# Patient Record
Sex: FEMALE | Race: BLACK OR AFRICAN AMERICAN | NOT HISPANIC OR LATINO | URBAN - METROPOLITAN AREA
[De-identification: names, ages, dates, MRNs, and addresses within clinical notes are randomized per-mention and may not be internally consistent; named-entity substitution may affect disease eponyms.]

---

## 2018-04-30 ENCOUNTER — EMERGENCY (EMERGENCY)
Facility: HOSPITAL | Age: 43
LOS: 1 days | Discharge: HOME | End: 2018-04-30
Attending: EMERGENCY MEDICINE | Admitting: EMERGENCY MEDICINE
Payer: COMMERCIAL

## 2018-04-30 VITALS
SYSTOLIC BLOOD PRESSURE: 146 MMHG | TEMPERATURE: 99 F | RESPIRATION RATE: 18 BRPM | OXYGEN SATURATION: 100 % | DIASTOLIC BLOOD PRESSURE: 90 MMHG | HEART RATE: 75 BPM

## 2018-04-30 VITALS
OXYGEN SATURATION: 100 % | RESPIRATION RATE: 18 BRPM | SYSTOLIC BLOOD PRESSURE: 185 MMHG | TEMPERATURE: 98 F | HEART RATE: 58 BPM | WEIGHT: 192.9 LBS | DIASTOLIC BLOOD PRESSURE: 96 MMHG

## 2018-04-30 DIAGNOSIS — R10.9 UNSPECIFIED ABDOMINAL PAIN: ICD-10-CM

## 2018-04-30 DIAGNOSIS — R10.13 EPIGASTRIC PAIN: ICD-10-CM

## 2018-04-30 LAB
ALBUMIN SERPL ELPH-MCNC: 4.4 G/DL — SIGNIFICANT CHANGE UP (ref 3.3–5)
ALP SERPL-CCNC: 76 U/L — SIGNIFICANT CHANGE UP (ref 40–120)
ALT FLD-CCNC: 36 U/L — SIGNIFICANT CHANGE UP (ref 10–45)
ANION GAP SERPL CALC-SCNC: 16 MMOL/L — SIGNIFICANT CHANGE UP (ref 5–17)
AST SERPL-CCNC: 40 U/L — SIGNIFICANT CHANGE UP (ref 10–40)
BASOPHILS NFR BLD AUTO: 0.4 % — SIGNIFICANT CHANGE UP (ref 0–2)
BILIRUB SERPL-MCNC: 0.7 MG/DL — SIGNIFICANT CHANGE UP (ref 0.2–1.2)
BUN SERPL-MCNC: 10 MG/DL — SIGNIFICANT CHANGE UP (ref 7–23)
CALCIUM SERPL-MCNC: 9.3 MG/DL — SIGNIFICANT CHANGE UP (ref 8.4–10.5)
CHLORIDE SERPL-SCNC: 94 MMOL/L — LOW (ref 96–108)
CO2 SERPL-SCNC: 23 MMOL/L — SIGNIFICANT CHANGE UP (ref 22–31)
CREAT SERPL-MCNC: 0.71 MG/DL — SIGNIFICANT CHANGE UP (ref 0.5–1.3)
EOSINOPHIL NFR BLD AUTO: 0 % — SIGNIFICANT CHANGE UP (ref 0–6)
GLUCOSE SERPL-MCNC: 138 MG/DL — HIGH (ref 70–99)
HCT VFR BLD CALC: 43 % — SIGNIFICANT CHANGE UP (ref 34.5–45)
HGB BLD-MCNC: 14.4 G/DL — SIGNIFICANT CHANGE UP (ref 11.5–15.5)
LIDOCAIN IGE QN: 23 U/L — SIGNIFICANT CHANGE UP (ref 7–60)
LYMPHOCYTES # BLD AUTO: 12.8 % — LOW (ref 13–44)
MCHC RBC-ENTMCNC: 30.2 PG — SIGNIFICANT CHANGE UP (ref 27–34)
MCHC RBC-ENTMCNC: 33.5 G/DL — SIGNIFICANT CHANGE UP (ref 32–36)
MCV RBC AUTO: 90.1 FL — SIGNIFICANT CHANGE UP (ref 80–100)
MONOCYTES NFR BLD AUTO: 4.1 % — SIGNIFICANT CHANGE UP (ref 2–14)
NEUTROPHILS NFR BLD AUTO: 82.7 % — HIGH (ref 43–77)
PLATELET # BLD AUTO: 251 K/UL — SIGNIFICANT CHANGE UP (ref 150–400)
POTASSIUM SERPL-MCNC: 4.5 MMOL/L — SIGNIFICANT CHANGE UP (ref 3.5–5.3)
POTASSIUM SERPL-SCNC: 4.5 MMOL/L — SIGNIFICANT CHANGE UP (ref 3.5–5.3)
PROT SERPL-MCNC: 8.7 G/DL — HIGH (ref 6–8.3)
RBC # BLD: 4.77 M/UL — SIGNIFICANT CHANGE UP (ref 3.8–5.2)
RBC # FLD: 12.2 % — SIGNIFICANT CHANGE UP (ref 10.3–16.9)
SODIUM SERPL-SCNC: 133 MMOL/L — LOW (ref 135–145)
WBC # BLD: 7.8 K/UL — SIGNIFICANT CHANGE UP (ref 3.8–10.5)
WBC # FLD AUTO: 7.8 K/UL — SIGNIFICANT CHANGE UP (ref 3.8–10.5)

## 2018-04-30 PROCEDURE — 84702 CHORIONIC GONADOTROPIN TEST: CPT

## 2018-04-30 PROCEDURE — 85025 COMPLETE CBC W/AUTO DIFF WBC: CPT

## 2018-04-30 PROCEDURE — 96374 THER/PROPH/DIAG INJ IV PUSH: CPT

## 2018-04-30 PROCEDURE — 99284 EMERGENCY DEPT VISIT MOD MDM: CPT | Mod: 25

## 2018-04-30 PROCEDURE — 80053 COMPREHEN METABOLIC PANEL: CPT

## 2018-04-30 PROCEDURE — 36415 COLL VENOUS BLD VENIPUNCTURE: CPT

## 2018-04-30 PROCEDURE — 96375 TX/PRO/DX INJ NEW DRUG ADDON: CPT

## 2018-04-30 PROCEDURE — 99284 EMERGENCY DEPT VISIT MOD MDM: CPT

## 2018-04-30 PROCEDURE — 83690 ASSAY OF LIPASE: CPT

## 2018-04-30 RX ORDER — ONDANSETRON 8 MG/1
4 TABLET, FILM COATED ORAL ONCE
Qty: 0 | Refills: 0 | Status: COMPLETED | OUTPATIENT
Start: 2018-04-30 | End: 2018-04-30

## 2018-04-30 RX ORDER — SODIUM CHLORIDE 9 MG/ML
1000 INJECTION INTRAMUSCULAR; INTRAVENOUS; SUBCUTANEOUS ONCE
Qty: 0 | Refills: 0 | Status: COMPLETED | OUTPATIENT
Start: 2018-04-30 | End: 2018-04-30

## 2018-04-30 RX ORDER — HYDROMORPHONE HYDROCHLORIDE 2 MG/ML
1 INJECTION INTRAMUSCULAR; INTRAVENOUS; SUBCUTANEOUS ONCE
Qty: 0 | Refills: 0 | Status: DISCONTINUED | OUTPATIENT
Start: 2018-04-30 | End: 2018-04-30

## 2018-04-30 RX ORDER — FAMOTIDINE 10 MG/ML
20 INJECTION INTRAVENOUS ONCE
Qty: 0 | Refills: 0 | Status: COMPLETED | OUTPATIENT
Start: 2018-04-30 | End: 2018-04-30

## 2018-04-30 RX ORDER — HYDROMORPHONE HYDROCHLORIDE 2 MG/ML
0.5 INJECTION INTRAMUSCULAR; INTRAVENOUS; SUBCUTANEOUS ONCE
Qty: 0 | Refills: 0 | Status: DISCONTINUED | OUTPATIENT
Start: 2018-04-30 | End: 2018-04-30

## 2018-04-30 RX ORDER — METOCLOPRAMIDE HCL 10 MG
10 TABLET ORAL ONCE
Qty: 0 | Refills: 0 | Status: COMPLETED | OUTPATIENT
Start: 2018-04-30 | End: 2018-04-30

## 2018-04-30 RX ORDER — IOHEXOL 300 MG/ML
50 INJECTION, SOLUTION INTRAVENOUS ONCE
Qty: 0 | Refills: 0 | Status: COMPLETED | OUTPATIENT
Start: 2018-04-30 | End: 2018-04-30

## 2018-04-30 RX ADMIN — SODIUM CHLORIDE 2000 MILLILITER(S): 9 INJECTION INTRAMUSCULAR; INTRAVENOUS; SUBCUTANEOUS at 17:27

## 2018-04-30 RX ADMIN — HYDROMORPHONE HYDROCHLORIDE 1 MILLIGRAM(S): 2 INJECTION INTRAMUSCULAR; INTRAVENOUS; SUBCUTANEOUS at 15:48

## 2018-04-30 RX ADMIN — IOHEXOL 50 MILLILITER(S): 300 INJECTION, SOLUTION INTRAVENOUS at 19:58

## 2018-04-30 RX ADMIN — ONDANSETRON 4 MILLIGRAM(S): 8 TABLET, FILM COATED ORAL at 15:02

## 2018-04-30 RX ADMIN — FAMOTIDINE 20 MILLIGRAM(S): 10 INJECTION INTRAVENOUS at 15:33

## 2018-04-30 RX ADMIN — Medication 10 MILLIGRAM(S): at 20:30

## 2018-04-30 RX ADMIN — SODIUM CHLORIDE 1000 MILLILITER(S): 9 INJECTION INTRAMUSCULAR; INTRAVENOUS; SUBCUTANEOUS at 15:02

## 2018-04-30 RX ADMIN — HYDROMORPHONE HYDROCHLORIDE 1 MILLIGRAM(S): 2 INJECTION INTRAMUSCULAR; INTRAVENOUS; SUBCUTANEOUS at 15:33

## 2018-04-30 NOTE — ED ADULT NURSE NOTE - OBJECTIVE STATEMENT
Pt reports starting menstrual cycle on Saturday, and since has been experiencing LUQ pain, n/v, unable to eat/drink. Pt also reports diarrhea since Saturday. Pt denies chills, fever, chest pain, SOB, dizziness.

## 2018-04-30 NOTE — ED PROVIDER NOTE - PROGRESS NOTE DETAILS
Pt was feeling better but now pain and nausea returned, still in upper abdomen and epigastrium. Will Ct a/p to r/o colitis. Reglan for nausea and Dilaudid prn for pain. Dispo pending CT. took over care, Mor: pt with hx of endometriosis with upper epi pain, nausea, received meds for discomfort, receiving CT to eval for colitis.

## 2018-04-30 NOTE — ED PROVIDER NOTE - PHYSICAL EXAMINATION
GEN: Well appearing, well nourished, awake, alert, oriented to person, place, time/situation and in no apparent distress.  ENT: Airway patent, Nasal mucosa clear. Mouth with normal mucosa.  EYES: Clear bilaterally.  RESPIRATORY: Breathing comfortably with normal RR.  CARDIAC: Regular rate and rhythm  ABDOMEN: Soft, mild epigastric discomfort, +bowel sounds, no rebound, rigidity, or guarding. No masses or hernia, nonsurgical abd exam.   MSK: Range of motion is not limited, no deformities noted.  NEURO: Alert and oriented, no focal deficits.  SKIN: Skin normal color for race, warm, dry and intact. No evidence of rash.  PSYCH: Alert and oriented to person, place, time/situation. normal mood and affect. no apparent risk to self or others.

## 2018-04-30 NOTE — ED PROVIDER NOTE - MEDICAL DECISION MAKING DETAILS
42F with above PMHx,  x 2, with abdominal pain in epigastrium 42F with above PMHx,  x 2, with abdominal pain in epigastrium.  Patient pain free feeling better. Will collect UA but no symptoms. Tolerated PO. Warning instructions were discussed.

## 2018-04-30 NOTE — ED PROVIDER NOTE - OBJECTIVE STATEMENT
42F with a h/o endometriosis, LMP 4 days ago who p/w abdominal pain, nausea and nonbloody vomits, c/w her endometriosis pain. Pain is typical in her epigastrium, not lower abdomina. She report he endo was under control with OCPs but she stopped these 3 months ago bc her sx had been improving. no CP/ SOB, no urinary sx. OB is Dr. Sloan in [place]. 42F with a h/o endometriosis, LMP 4 days ago who p/w abdominal pain, nausea and nonbloody vomits, c/w her endometriosis pain. Pt states that her endometriosis pain is typical in her epigastrium, not lower abdomen. She reports her endo was under control with OCPs but she stopped these 3 months ago bc her sx had been improving. no f/c, CP/ SOB, no urinary sx. OB is Dr. Sloan in [place].

## 2018-04-30 NOTE — ED ADULT NURSE REASSESSMENT NOTE - NS ED NURSE REASSESS COMMENT FT1
Pt resting on stretcher AAO x 3 speech is clear and coherent breathing even and unlabored. pt c/o nausea. Pt medicated as ordered with reglan. Pt states pressure like feeling in abdominal region. pt states " I am trying to tolerate CT drink but I am unable too" ERP made aware

## 2018-05-01 NOTE — ED ADULT NURSE REASSESSMENT NOTE - NS ED NURSE REASSESS COMMENT FT1
pt discharged. pt states " I feel so much better I am ready to go home"  Pt denies pain at this time. Pt verbally acknowledged discharge instructions.

## 2018-07-05 NOTE — ED ADULT NURSE NOTE - NS ED NURSE LEVEL OF CONSCIOUSNESS SPEECH
Pt is participating with therapy and expected to return home w/family support  Pt has been educated on potential recommendations for contd care and safety as well as therapy services  Speaking Coherently

## 2018-11-05 ENCOUNTER — EMERGENCY (EMERGENCY)
Facility: HOSPITAL | Age: 43
LOS: 1 days | Discharge: HOME | End: 2018-11-05
Attending: EMERGENCY MEDICINE | Admitting: EMERGENCY MEDICINE
Payer: COMMERCIAL

## 2018-11-05 VITALS
HEART RATE: 62 BPM | TEMPERATURE: 99 F | DIASTOLIC BLOOD PRESSURE: 93 MMHG | RESPIRATION RATE: 18 BRPM | SYSTOLIC BLOOD PRESSURE: 176 MMHG | OXYGEN SATURATION: 100 %

## 2018-11-05 VITALS
RESPIRATION RATE: 16 BRPM | TEMPERATURE: 98 F | DIASTOLIC BLOOD PRESSURE: 70 MMHG | SYSTOLIC BLOOD PRESSURE: 114 MMHG | HEART RATE: 60 BPM | OXYGEN SATURATION: 95 %

## 2018-11-05 DIAGNOSIS — K25.7 CHRONIC GASTRIC ULCER WITHOUT HEMORRHAGE OR PERFORATION: ICD-10-CM

## 2018-11-05 DIAGNOSIS — R10.13 EPIGASTRIC PAIN: ICD-10-CM

## 2018-11-05 LAB
ALBUMIN SERPL ELPH-MCNC: 4.4 G/DL — SIGNIFICANT CHANGE UP (ref 3.3–5)
ALP SERPL-CCNC: 68 U/L — SIGNIFICANT CHANGE UP (ref 40–120)
ALT FLD-CCNC: 19 U/L — SIGNIFICANT CHANGE UP (ref 10–45)
ANION GAP SERPL CALC-SCNC: 20 MMOL/L — HIGH (ref 5–17)
AST SERPL-CCNC: 18 U/L — SIGNIFICANT CHANGE UP (ref 10–40)
BASOPHILS NFR BLD AUTO: 0.1 % — SIGNIFICANT CHANGE UP (ref 0–2)
BILIRUB SERPL-MCNC: 0.4 MG/DL — SIGNIFICANT CHANGE UP (ref 0.2–1.2)
BUN SERPL-MCNC: 9 MG/DL — SIGNIFICANT CHANGE UP (ref 7–23)
CALCIUM SERPL-MCNC: 9.3 MG/DL — SIGNIFICANT CHANGE UP (ref 8.4–10.5)
CHLORIDE SERPL-SCNC: 94 MMOL/L — LOW (ref 96–108)
CO2 SERPL-SCNC: 23 MMOL/L — SIGNIFICANT CHANGE UP (ref 22–31)
CREAT SERPL-MCNC: 0.76 MG/DL — SIGNIFICANT CHANGE UP (ref 0.5–1.3)
GLUCOSE SERPL-MCNC: 140 MG/DL — HIGH (ref 70–99)
HCT VFR BLD CALC: 36.8 % — SIGNIFICANT CHANGE UP (ref 34.5–45)
HGB BLD-MCNC: 12.4 G/DL — SIGNIFICANT CHANGE UP (ref 11.5–15.5)
LIDOCAIN IGE QN: 78 U/L — HIGH (ref 7–60)
LYMPHOCYTES # BLD AUTO: 10 % — LOW (ref 13–44)
MCHC RBC-ENTMCNC: 30.8 PG — SIGNIFICANT CHANGE UP (ref 27–34)
MCHC RBC-ENTMCNC: 33.7 G/DL — SIGNIFICANT CHANGE UP (ref 32–36)
MCV RBC AUTO: 91.3 FL — SIGNIFICANT CHANGE UP (ref 80–100)
MONOCYTES NFR BLD AUTO: 10.9 % — SIGNIFICANT CHANGE UP (ref 2–14)
NEUTROPHILS NFR BLD AUTO: 79 % — HIGH (ref 43–77)
PLATELET # BLD AUTO: 249 K/UL — SIGNIFICANT CHANGE UP (ref 150–400)
POTASSIUM SERPL-MCNC: 3.3 MMOL/L — LOW (ref 3.5–5.3)
POTASSIUM SERPL-SCNC: 3.3 MMOL/L — LOW (ref 3.5–5.3)
PROT SERPL-MCNC: 8.6 G/DL — HIGH (ref 6–8.3)
RBC # BLD: 4.03 M/UL — SIGNIFICANT CHANGE UP (ref 3.8–5.2)
RBC # FLD: 12.1 % — SIGNIFICANT CHANGE UP (ref 10.3–16.9)
SODIUM SERPL-SCNC: 137 MMOL/L — SIGNIFICANT CHANGE UP (ref 135–145)
WBC # BLD: 8.9 K/UL — SIGNIFICANT CHANGE UP (ref 3.8–10.5)
WBC # FLD AUTO: 8.9 K/UL — SIGNIFICANT CHANGE UP (ref 3.8–10.5)

## 2018-11-05 PROCEDURE — 85025 COMPLETE CBC W/AUTO DIFF WBC: CPT

## 2018-11-05 PROCEDURE — 76705 ECHO EXAM OF ABDOMEN: CPT | Mod: 26

## 2018-11-05 PROCEDURE — 83690 ASSAY OF LIPASE: CPT

## 2018-11-05 PROCEDURE — 80053 COMPREHEN METABOLIC PANEL: CPT

## 2018-11-05 PROCEDURE — 36415 COLL VENOUS BLD VENIPUNCTURE: CPT

## 2018-11-05 PROCEDURE — 99284 EMERGENCY DEPT VISIT MOD MDM: CPT | Mod: 25

## 2018-11-05 PROCEDURE — 76705 ECHO EXAM OF ABDOMEN: CPT

## 2018-11-05 PROCEDURE — 96375 TX/PRO/DX INJ NEW DRUG ADDON: CPT

## 2018-11-05 PROCEDURE — 96374 THER/PROPH/DIAG INJ IV PUSH: CPT

## 2018-11-05 PROCEDURE — 99285 EMERGENCY DEPT VISIT HI MDM: CPT

## 2018-11-05 RX ORDER — POTASSIUM CHLORIDE 20 MEQ
20 PACKET (EA) ORAL ONCE
Qty: 0 | Refills: 0 | Status: COMPLETED | OUTPATIENT
Start: 2018-11-05 | End: 2018-11-05

## 2018-11-05 RX ORDER — HYDROMORPHONE HYDROCHLORIDE 2 MG/ML
1 INJECTION INTRAMUSCULAR; INTRAVENOUS; SUBCUTANEOUS ONCE
Qty: 0 | Refills: 0 | Status: DISCONTINUED | OUTPATIENT
Start: 2018-11-05 | End: 2018-11-05

## 2018-11-05 RX ORDER — MORPHINE SULFATE 50 MG/1
2 CAPSULE, EXTENDED RELEASE ORAL ONCE
Qty: 0 | Refills: 0 | Status: DISCONTINUED | OUTPATIENT
Start: 2018-11-05 | End: 2018-11-05

## 2018-11-05 RX ORDER — FAMOTIDINE 10 MG/ML
20 INJECTION INTRAVENOUS ONCE
Qty: 0 | Refills: 0 | Status: COMPLETED | OUTPATIENT
Start: 2018-11-05 | End: 2018-11-05

## 2018-11-05 RX ORDER — SODIUM CHLORIDE 9 MG/ML
2000 INJECTION INTRAMUSCULAR; INTRAVENOUS; SUBCUTANEOUS ONCE
Qty: 0 | Refills: 0 | Status: COMPLETED | OUTPATIENT
Start: 2018-11-05 | End: 2018-11-05

## 2018-11-05 RX ORDER — LIDOCAINE 4 G/100G
15 CREAM TOPICAL ONCE
Qty: 0 | Refills: 0 | Status: COMPLETED | OUTPATIENT
Start: 2018-11-05 | End: 2018-11-05

## 2018-11-05 RX ORDER — ONDANSETRON 8 MG/1
4 TABLET, FILM COATED ORAL ONCE
Qty: 0 | Refills: 0 | Status: COMPLETED | OUTPATIENT
Start: 2018-11-05 | End: 2018-11-05

## 2018-11-05 RX ADMIN — MORPHINE SULFATE 2 MILLIGRAM(S): 50 CAPSULE, EXTENDED RELEASE ORAL at 19:19

## 2018-11-05 RX ADMIN — Medication 20 MILLIEQUIVALENT(S): at 21:14

## 2018-11-05 RX ADMIN — ONDANSETRON 4 MILLIGRAM(S): 8 TABLET, FILM COATED ORAL at 19:38

## 2018-11-05 RX ADMIN — FAMOTIDINE 20 MILLIGRAM(S): 10 INJECTION INTRAVENOUS at 19:03

## 2018-11-05 RX ADMIN — LIDOCAINE 15 MILLILITER(S): 4 CREAM TOPICAL at 19:03

## 2018-11-05 RX ADMIN — SODIUM CHLORIDE 1000 MILLILITER(S): 9 INJECTION INTRAMUSCULAR; INTRAVENOUS; SUBCUTANEOUS at 19:04

## 2018-11-05 RX ADMIN — Medication 30 MILLILITER(S): at 19:03

## 2018-11-05 RX ADMIN — MORPHINE SULFATE 2 MILLIGRAM(S): 50 CAPSULE, EXTENDED RELEASE ORAL at 19:04

## 2018-11-05 RX ADMIN — HYDROMORPHONE HYDROCHLORIDE 1 MILLIGRAM(S): 2 INJECTION INTRAMUSCULAR; INTRAVENOUS; SUBCUTANEOUS at 21:10

## 2018-11-05 RX ADMIN — HYDROMORPHONE HYDROCHLORIDE 1 MILLIGRAM(S): 2 INJECTION INTRAMUSCULAR; INTRAVENOUS; SUBCUTANEOUS at 19:41

## 2018-11-05 NOTE — ED ADULT NURSE NOTE - NSIMPLEMENTINTERV_GEN_ALL_ED
Implemented All Universal Safety Interventions:  [place] to call system. Call bell, personal items and telephone within reach. Instruct patient to call for assistance. Room bathroom lighting operational. Non-slip footwear when patient is off stretcher. Physically safe environment: no spills, clutter or unnecessary equipment. Stretcher in lowest position, wheels locked, appropriate side rails in place.

## 2018-11-05 NOTE — ED PROVIDER NOTE - OBJECTIVE STATEMENT
44 y/o female with a PMHx of gastric ulcer (confirmed on endoscopy) and endometriosis is present with abdominal pain. Pain is located epigastric without radiation. She describes the pain as sharp and constant that is 10/10 pain. Her associating symptoms include nausea and multiple episodes of vomiting. She denies the following: fever, chills, blood in vomit, dizziness, chest, pain, sob, difficulty breathing, hx of cardiac ds, gerd, use of alcohol, drugs, and denies hx of abdominal surgery, weight loss or loss of appetite.

## 2018-11-05 NOTE — ED PROVIDER NOTE - MEDICAL DECISION MAKING DETAILS
44 y/o female with epigastric pain, non-billous and non-bloody vomiting presenting in ED who was pain upgraded. Nausea and pain treated. Plan for labs and US. EKG nsr. Do not suspect cardiac. Possible exacerbation of gastric ulcer. Do not suspect perforation. Will dispo accordingly.

## 2018-11-05 NOTE — ED ADULT NURSE NOTE - OBJECTIVE STATEMENT
Pt presents with abd pain and vomiting since yesterday. Pt states she can't keep anything in her stomach. Pt also states she has a gastric ulcer and this feels like an exacerbation. Pt states pain is burning 10/10 in the epigastric area. Denies CP, SOB or fever.

## 2018-11-05 NOTE — ED PROVIDER NOTE - ATTENDING CONTRIBUTION TO CARE
I have examined the pt, reviewed all pertinent clinical data, and I agree with the documentation/care/plan executed by YAEL Antoine.

## 2018-11-06 PROBLEM — N80.9 ENDOMETRIOSIS, UNSPECIFIED: Chronic | Status: ACTIVE | Noted: 2018-04-30

## 2018-12-19 ENCOUNTER — EMERGENCY (EMERGENCY)
Facility: HOSPITAL | Age: 43
LOS: 1 days | Discharge: HOME | End: 2018-12-19
Attending: EMERGENCY MEDICINE | Admitting: EMERGENCY MEDICINE
Payer: COMMERCIAL

## 2018-12-19 VITALS
DIASTOLIC BLOOD PRESSURE: 95 MMHG | HEART RATE: 75 BPM | WEIGHT: 169.98 LBS | TEMPERATURE: 98 F | OXYGEN SATURATION: 100 % | RESPIRATION RATE: 18 BRPM | SYSTOLIC BLOOD PRESSURE: 192 MMHG | HEIGHT: 71 IN

## 2018-12-19 VITALS
OXYGEN SATURATION: 99 % | SYSTOLIC BLOOD PRESSURE: 145 MMHG | DIASTOLIC BLOOD PRESSURE: 82 MMHG | TEMPERATURE: 99 F | HEART RATE: 67 BPM | RESPIRATION RATE: 20 BRPM

## 2018-12-19 DIAGNOSIS — R11.2 NAUSEA WITH VOMITING, UNSPECIFIED: ICD-10-CM

## 2018-12-19 DIAGNOSIS — R10.84 GENERALIZED ABDOMINAL PAIN: ICD-10-CM

## 2018-12-19 LAB
ALBUMIN SERPL ELPH-MCNC: 4.3 G/DL — SIGNIFICANT CHANGE UP (ref 3.3–5)
ALP SERPL-CCNC: 71 U/L — SIGNIFICANT CHANGE UP (ref 40–120)
ALT FLD-CCNC: 16 U/L — SIGNIFICANT CHANGE UP (ref 10–45)
ANION GAP SERPL CALC-SCNC: 21 MMOL/L — HIGH (ref 5–17)
ANION GAP SERPL CALC-SCNC: 22 MMOL/L — HIGH (ref 5–17)
APPEARANCE UR: ABNORMAL
AST SERPL-CCNC: 17 U/L — SIGNIFICANT CHANGE UP (ref 10–40)
BACTERIA # UR AUTO: PRESENT /HPF
BASOPHILS NFR BLD AUTO: 0.1 % — SIGNIFICANT CHANGE UP (ref 0–2)
BILIRUB SERPL-MCNC: 0.4 MG/DL — SIGNIFICANT CHANGE UP (ref 0.2–1.2)
BILIRUB UR-MCNC: NEGATIVE — SIGNIFICANT CHANGE UP
BUN SERPL-MCNC: 6 MG/DL — LOW (ref 7–23)
BUN SERPL-MCNC: 6 MG/DL — LOW (ref 7–23)
CALCIUM SERPL-MCNC: 9.1 MG/DL — SIGNIFICANT CHANGE UP (ref 8.4–10.5)
CALCIUM SERPL-MCNC: 9.7 MG/DL — SIGNIFICANT CHANGE UP (ref 8.4–10.5)
CHLORIDE SERPL-SCNC: 94 MMOL/L — LOW (ref 96–108)
CHLORIDE SERPL-SCNC: 96 MMOL/L — SIGNIFICANT CHANGE UP (ref 96–108)
CO2 SERPL-SCNC: 19 MMOL/L — LOW (ref 22–31)
CO2 SERPL-SCNC: 22 MMOL/L — SIGNIFICANT CHANGE UP (ref 22–31)
COLOR SPEC: ABNORMAL
CREAT SERPL-MCNC: 0.62 MG/DL — SIGNIFICANT CHANGE UP (ref 0.5–1.3)
CREAT SERPL-MCNC: 0.62 MG/DL — SIGNIFICANT CHANGE UP (ref 0.5–1.3)
DIFF PNL FLD: ABNORMAL
EOSINOPHIL NFR BLD AUTO: 0 % — SIGNIFICANT CHANGE UP (ref 0–6)
EPI CELLS # UR: ABNORMAL /HPF (ref 0–5)
EXTRA BLUE TOP TUBE: SIGNIFICANT CHANGE UP
GLUCOSE SERPL-MCNC: 131 MG/DL — HIGH (ref 70–99)
GLUCOSE SERPL-MCNC: 163 MG/DL — HIGH (ref 70–99)
GLUCOSE UR QL: 250
HCG SERPL-ACNC: <.1 MIU/ML — SIGNIFICANT CHANGE UP
HCG UR QL: NEGATIVE — SIGNIFICANT CHANGE UP
HCT VFR BLD CALC: 39.8 % — SIGNIFICANT CHANGE UP (ref 34.5–45)
HGB BLD-MCNC: 13.4 G/DL — SIGNIFICANT CHANGE UP (ref 11.5–15.5)
KETONES UR-MCNC: ABNORMAL MG/DL
LEUKOCYTE ESTERASE UR-ACNC: ABNORMAL
LIDOCAIN IGE QN: 101 U/L — HIGH (ref 7–60)
LYMPHOCYTES # BLD AUTO: 7 % — LOW (ref 13–44)
MCHC RBC-ENTMCNC: 30.3 PG — SIGNIFICANT CHANGE UP (ref 27–34)
MCHC RBC-ENTMCNC: 33.7 G/DL — SIGNIFICANT CHANGE UP (ref 32–36)
MCV RBC AUTO: 90 FL — SIGNIFICANT CHANGE UP (ref 80–100)
MONOCYTES NFR BLD AUTO: 4.7 % — SIGNIFICANT CHANGE UP (ref 2–14)
NEUTROPHILS NFR BLD AUTO: 88.2 % — HIGH (ref 43–77)
NITRITE UR-MCNC: NEGATIVE — SIGNIFICANT CHANGE UP
PH UR: 7 — SIGNIFICANT CHANGE UP (ref 5–8)
PLATELET # BLD AUTO: 245 K/UL — SIGNIFICANT CHANGE UP (ref 150–400)
POTASSIUM SERPL-MCNC: 3.4 MMOL/L — LOW (ref 3.5–5.3)
POTASSIUM SERPL-MCNC: 3.6 MMOL/L — SIGNIFICANT CHANGE UP (ref 3.5–5.3)
POTASSIUM SERPL-SCNC: 3.4 MMOL/L — LOW (ref 3.5–5.3)
POTASSIUM SERPL-SCNC: 3.6 MMOL/L — SIGNIFICANT CHANGE UP (ref 3.5–5.3)
PROT SERPL-MCNC: 8.6 G/DL — HIGH (ref 6–8.3)
PROT UR-MCNC: 30 MG/DL
RBC # BLD: 4.42 M/UL — SIGNIFICANT CHANGE UP (ref 3.8–5.2)
RBC # FLD: 12.2 % — SIGNIFICANT CHANGE UP (ref 10.3–16.9)
RBC CASTS # UR COMP ASSIST: ABNORMAL /HPF
SODIUM SERPL-SCNC: 135 MMOL/L — SIGNIFICANT CHANGE UP (ref 135–145)
SODIUM SERPL-SCNC: 139 MMOL/L — SIGNIFICANT CHANGE UP (ref 135–145)
SP GR SPEC: 1.02 — SIGNIFICANT CHANGE UP (ref 1–1.03)
UROBILINOGEN FLD QL: 0.2 E.U./DL — SIGNIFICANT CHANGE UP
WBC # BLD: 8 K/UL — SIGNIFICANT CHANGE UP (ref 3.8–10.5)
WBC # FLD AUTO: 8 K/UL — SIGNIFICANT CHANGE UP (ref 3.8–10.5)
WBC UR QL: ABNORMAL /HPF

## 2018-12-19 PROCEDURE — 76856 US EXAM PELVIC COMPLETE: CPT

## 2018-12-19 PROCEDURE — 76856 US EXAM PELVIC COMPLETE: CPT | Mod: 26

## 2018-12-19 PROCEDURE — 85610 PROTHROMBIN TIME: CPT

## 2018-12-19 PROCEDURE — 81025 URINE PREGNANCY TEST: CPT

## 2018-12-19 PROCEDURE — 80053 COMPREHEN METABOLIC PANEL: CPT

## 2018-12-19 PROCEDURE — 83690 ASSAY OF LIPASE: CPT

## 2018-12-19 PROCEDURE — 80048 BASIC METABOLIC PNL TOTAL CA: CPT

## 2018-12-19 PROCEDURE — 99284 EMERGENCY DEPT VISIT MOD MDM: CPT | Mod: 25

## 2018-12-19 PROCEDURE — 85730 THROMBOPLASTIN TIME PARTIAL: CPT

## 2018-12-19 PROCEDURE — 76830 TRANSVAGINAL US NON-OB: CPT

## 2018-12-19 PROCEDURE — 96374 THER/PROPH/DIAG INJ IV PUSH: CPT

## 2018-12-19 PROCEDURE — 36415 COLL VENOUS BLD VENIPUNCTURE: CPT

## 2018-12-19 PROCEDURE — 96361 HYDRATE IV INFUSION ADD-ON: CPT

## 2018-12-19 PROCEDURE — 81001 URINALYSIS AUTO W/SCOPE: CPT

## 2018-12-19 PROCEDURE — 96375 TX/PRO/DX INJ NEW DRUG ADDON: CPT

## 2018-12-19 PROCEDURE — 99285 EMERGENCY DEPT VISIT HI MDM: CPT

## 2018-12-19 PROCEDURE — 96376 TX/PRO/DX INJ SAME DRUG ADON: CPT

## 2018-12-19 PROCEDURE — 84702 CHORIONIC GONADOTROPIN TEST: CPT

## 2018-12-19 PROCEDURE — 76830 TRANSVAGINAL US NON-OB: CPT | Mod: 26

## 2018-12-19 PROCEDURE — 85025 COMPLETE CBC W/AUTO DIFF WBC: CPT

## 2018-12-19 RX ORDER — SODIUM CHLORIDE 9 MG/ML
1000 INJECTION INTRAMUSCULAR; INTRAVENOUS; SUBCUTANEOUS ONCE
Qty: 0 | Refills: 0 | Status: COMPLETED | OUTPATIENT
Start: 2018-12-19 | End: 2018-12-19

## 2018-12-19 RX ORDER — ONDANSETRON 8 MG/1
4 TABLET, FILM COATED ORAL ONCE
Qty: 0 | Refills: 0 | Status: COMPLETED | OUTPATIENT
Start: 2018-12-19 | End: 2018-12-19

## 2018-12-19 RX ORDER — HYDROMORPHONE HYDROCHLORIDE 2 MG/ML
0.5 INJECTION INTRAMUSCULAR; INTRAVENOUS; SUBCUTANEOUS ONCE
Qty: 0 | Refills: 0 | Status: DISCONTINUED | OUTPATIENT
Start: 2018-12-19 | End: 2018-12-19

## 2018-12-19 RX ORDER — KETOROLAC TROMETHAMINE 30 MG/ML
30 SYRINGE (ML) INJECTION ONCE
Qty: 0 | Refills: 0 | Status: DISCONTINUED | OUTPATIENT
Start: 2018-12-19 | End: 2018-12-19

## 2018-12-19 RX ORDER — ONDANSETRON 8 MG/1
1 TABLET, FILM COATED ORAL
Qty: 12 | Refills: 0
Start: 2018-12-19 | End: 2018-12-22

## 2018-12-19 RX ORDER — LIDOCAINE 4 G/100G
10 CREAM TOPICAL ONCE
Qty: 0 | Refills: 0 | Status: COMPLETED | OUTPATIENT
Start: 2018-12-19 | End: 2018-12-19

## 2018-12-19 RX ORDER — ACETAMINOPHEN 500 MG
1000 TABLET ORAL ONCE
Qty: 0 | Refills: 0 | Status: COMPLETED | OUTPATIENT
Start: 2018-12-19 | End: 2018-12-19

## 2018-12-19 RX ORDER — PANTOPRAZOLE SODIUM 20 MG/1
40 TABLET, DELAYED RELEASE ORAL ONCE
Qty: 0 | Refills: 0 | Status: COMPLETED | OUTPATIENT
Start: 2018-12-19 | End: 2018-12-19

## 2018-12-19 RX ORDER — OXYCODONE AND ACETAMINOPHEN 5; 325 MG/1; MG/1
1 TABLET ORAL ONCE
Qty: 0 | Refills: 0 | Status: DISCONTINUED | OUTPATIENT
Start: 2018-12-19 | End: 2018-12-19

## 2018-12-19 RX ADMIN — Medication 30 MILLILITER(S): at 16:27

## 2018-12-19 RX ADMIN — OXYCODONE AND ACETAMINOPHEN 1 TABLET(S): 5; 325 TABLET ORAL at 19:00

## 2018-12-19 RX ADMIN — ONDANSETRON 4 MILLIGRAM(S): 8 TABLET, FILM COATED ORAL at 12:03

## 2018-12-19 RX ADMIN — OXYCODONE AND ACETAMINOPHEN 1 TABLET(S): 5; 325 TABLET ORAL at 18:52

## 2018-12-19 RX ADMIN — HYDROMORPHONE HYDROCHLORIDE 0.5 MILLIGRAM(S): 2 INJECTION INTRAMUSCULAR; INTRAVENOUS; SUBCUTANEOUS at 12:33

## 2018-12-19 RX ADMIN — HYDROMORPHONE HYDROCHLORIDE 0.5 MILLIGRAM(S): 2 INJECTION INTRAMUSCULAR; INTRAVENOUS; SUBCUTANEOUS at 12:03

## 2018-12-19 RX ADMIN — ONDANSETRON 4 MILLIGRAM(S): 8 TABLET, FILM COATED ORAL at 16:44

## 2018-12-19 RX ADMIN — Medication 30 MILLIGRAM(S): at 16:28

## 2018-12-19 RX ADMIN — SODIUM CHLORIDE 1000 MILLILITER(S): 9 INJECTION INTRAMUSCULAR; INTRAVENOUS; SUBCUTANEOUS at 17:55

## 2018-12-19 RX ADMIN — Medication 1000 MILLIGRAM(S): at 16:41

## 2018-12-19 RX ADMIN — Medication 30 MILLIGRAM(S): at 17:00

## 2018-12-19 RX ADMIN — PANTOPRAZOLE SODIUM 40 MILLIGRAM(S): 20 TABLET, DELAYED RELEASE ORAL at 16:28

## 2018-12-19 RX ADMIN — Medication 400 MILLIGRAM(S): at 16:27

## 2018-12-19 RX ADMIN — LIDOCAINE 10 MILLILITER(S): 4 CREAM TOPICAL at 16:27

## 2018-12-19 RX ADMIN — SODIUM CHLORIDE 1000 MILLILITER(S): 9 INJECTION INTRAMUSCULAR; INTRAVENOUS; SUBCUTANEOUS at 12:58

## 2018-12-19 RX ADMIN — SODIUM CHLORIDE 1000 MILLILITER(S): 9 INJECTION INTRAMUSCULAR; INTRAVENOUS; SUBCUTANEOUS at 12:03

## 2018-12-19 NOTE — ED ADULT TRIAGE NOTE - CHIEF COMPLAINT QUOTE
Patient c/o abdominal pain , nausea, generalized weakness  , vomiting and diarrhea since last night ,.

## 2018-12-19 NOTE — ED PROVIDER NOTE - MEDICAL DECISION MAKING DETAILS
43 year old female pmhx endometriosis presents to the ed with abdominal pain. states that menstrual cycle began yesterday with associated nausea and vomitting 43 year old female pmhx endometriosis presents to the ed with abdominal pain. states that menstrual cycle began yesterday with associated nausea and vomitting. physical exam, patient appears well, non-toxic. ttp suprapubic region. labs reviewed, anion gap present which may be 2/2 vomitting patient has been experiencing. patient given fluids and medications. pelvic exam unremarkable. us does not show evidence of pathology. upon multiple re-evaluations, patient appears to be resting comfortably and sleeping. states that she is feeling better, but would like additional pain medication. I discussed I would like to change to Po medications and po challenge which the patient is agreeable to. labs as well as imaging reviewed with the patient.    Patient understands that symptoms may worsen and the testing in the emergency department does not rule out the early stages of a possible surgical condition. 43 year old female pmhx endometriosis presents to the ed with abdominal pain. states that menstrual cycle began yesterday with associated nausea and vomitting. physical exam, patient appears well, non-toxic. ttp suprapubic region. labs reviewed, anion gap present which may be 2/2 vomitting patient has been experiencing. patient given fluids and medications. pelvic exam unremarkable. us does not show evidence of pathology. upon multiple re-evaluations, patient appears to be resting comfortably and sleeping. states that she is feeling better, but would like additional pain medication. I discussed I would like to change to Po medications and po challenge which the patient is agreeable to. labs as well as imaging reviewed with the patient. ED evaluation and management discussed with the patient and family (if available) in detail.  Close PMD follow up encouraged.  Strict ED return instructions discussed in detail and patient given the opportunity to ask any questions about their discharge diagnosis and instructions. Patient verbalized understanding.     Patient understands that symptoms may worsen and the testing in the emergency department does not rule out the early stages of a possible surgical condition.

## 2018-12-19 NOTE — ED PROVIDER NOTE - OBJECTIVE STATEMENT
States that she began her menstrual cycle yesterday. States that each month, she has worsening of abdominal pain with menstruation. Patient has been having nausea and vomiting. states "the pain is everyone". patient has been unable to eat due to vomiting. states that she started birth control yesterday to help regulate her pain, started by obgyn. States that she last saw obgyn 2 weeks ago. States that she began her menstrual cycle yesterday. States that each month, she has worsening of abdominal pain with menstruation. Patient has been having nausea and vomiting. states "the pain is everyone". patient has been unable to eat due to vomiting. states that she started birth control yesterday to help regulate her pain, started by obgyn. States that she last saw obgyn 2 weeks ago and had an ultrasounds. patient states "I don't understand why this keeps happening to me". States that she began her menstrual cycle yesterday. States that each month, she has worsening of abdominal pain with menstruation. Patient has been having nausea and vomiting. states "the pain is everywhere". patient has been unable to eat due to vomiting. states that she started birth control yesterday to help regulate her pain, started by obgyn. States that she last saw obgyn 2 weeks ago and had an ultrasounds. patient states "I don't understand why this keeps happening to me". Patient that she was seen in November for similar presentation of abdominal pain, nausea and vomitting, stating, "I got really sick with morphine, but dilaudid really helped the pain".

## 2018-12-19 NOTE — ED ADULT NURSE NOTE - OBJECTIVE STATEMENT
44 y/o female c/o nausea, vomiting and abdominal pain, accompanied with diarrhea since yesterday. upon assessment pt reports feeling " weak and not being able to eat too much and vomiting a lot' pt had 2 episode of green bile emesis while assessment.

## 2018-12-19 NOTE — ED PROVIDER NOTE - ATTENDING CONTRIBUTION TO CARE
44 yo female h/o presumed endometriosis, gastritis/pud c/o severe pelvic pain and epigastric pain similar to pain she's been feeling monthly since 2012 related to her presumed endometriosis w n/v.  Pt started menses today.  Pt reports she had taken ocp but stopped 2/2 wt gain and that she's discussed laparoscopy w her gyn but was waiting until next month.  No abnl discharge prior to menses.  Emesis w/o blood.  Epigastric pain similar to prior pud related sx.  No fever, dysuria.  Well appearing, nad, nc/at, lung cta, heart reg, abd soft, pelvic per pa exam, nt, ext no gross deformity, no gross neuro deficits.  Suspect endometriosis and pud related pain, no sig concern for pid, cholecystitis.  Plan pain/gi meds, labs, u/s, reassess.

## 2018-12-19 NOTE — ED PROVIDER NOTE - NSFOLLOWUPINSTRUCTIONS_ED_ALL_ED_FT
AydinnianCanadian FrenchEnglishHaitian CreoleKoreanPolishPortugueseRussianSpanishTagalogTraditional ChineseVietnamese    Abdominal Pain, Adult  Abdominal pain can be caused by many things. Often, abdominal pain is not serious and it gets better with no treatment or by being treated at home. However, sometimes abdominal pain is serious. Your health care provider will do a medical history and a physical exam to try to determine the cause of your abdominal pain.    Follow these instructions at home:  Take over-the-counter and prescription medicines only as told by your health care provider. Do not take a laxative unless told by your health care provider.  ImageDrink enough fluid to keep your urine clear or pale yellow.  Watch your condition for any changes.  Keep all follow-up visits as told by your health care provider. This is important.  Contact a health care provider if:  Your abdominal pain changes or gets worse.  You are not hungry or you lose weight without trying.  You are constipated or have diarrhea for more than 2–3 days.  You have pain when you urinate or have a bowel movement.  Your abdominal pain wakes you up at night.  Your pain gets worse with meals, after eating, or with certain foods.  You are throwing up and cannot keep anything down.  You have a fever.  Get help right away if:  Your pain does not go away as soon as your health care provider told you to expect.  You cannot stop throwing up.  Your pain is only in areas of the abdomen, such as the right side or the left lower portion of the abdomen.  You have bloody or black stools, or stools that look like tar.  You have severe pain, cramping, or bloating in your abdomen.  You have signs of dehydration, such as:    Dark urine, very little urine, or no urine.  Cracked lips.  Dry mouth.  Sunken eyes.  Sleepiness.  Weakness.    This information is not intended to replace advice given to you by your health care provider. Make sure you discuss any questions you have with your health care provider.    Document Released: 09/27/2006 Document Revised: 07/07/2017 Document Reviewed: 05/31/2017  [employer] Interactive Patient Education © 2018 [employer]

## 2018-12-19 NOTE — ED ADULT NURSE REASSESSMENT NOTE - NS ED NURSE REASSESS COMMENT FT1
2 unsuccessful attempt made to obtained IV access and then  and 1 unsuccessful US guided IV done. YAEL Finn made aware.

## 2018-12-19 NOTE — ED PROVIDER NOTE - PROGRESS NOTE DETAILS
re-evaluation, patient states that she is feeling better and symptoms have subsided pelvic examination performed with chaperone (Letty) at bedside Director - pt signed out to YAEL Armstrong continuing to follow.  Plan dc after po challenge and finishing ivf. patient appears to be sleeping and arousable by voice. patient is po challenged. plan to d/c home if able to tolerate. patient is agreeable to plan

## 2018-12-19 NOTE — ED PROVIDER NOTE - PHYSICAL EXAMINATION
General: Patient is well developed and well nourised. Patient is alert and oriented to person, place and date. Patient is laying comfortably in stretcher and appears in no acute distress.  HEENT: Head is normocephalic and atraumatic. Pupils are equal, round and reactive. Extraocular movements intact. No evidence of nystagmus, conjunctival injection, or scleral icterus. External ears symmetric without evidence of discharge.  Nose is symmetric, non-tender, patent without evidence of discharge. Teeth in good repair. Uvula midline.   Neck: Supple with no evidence of lymphadenopathy.  Full range of motion.  Heart: Regular rate and rhythm. No murmurs, rubs or gallops.   Lungs: Clear to auscultation bilaterally with equal chest expansion. No note of wheezes, rhonchi, rales. Equal chest expansion. No note of retractions.  Abdomen: ttp suprapubic region. Bowel sounds present in all four quadrants. Soft, non-tender, non-distended without signs of masses, rebound or guarding. No note of hepatosplenomegaly. No CVA tenderness bilaterally. Negative Ruano sign. No pain present over McBurney's point.  Musculoskeletal: No edema, erythema, ecchymosis, atrophy or deformity. Full range of motion in all four extremities.  No clubbing or cyanosis. No point tenderness to palpation.   Neuro: Cranial nerves II-XII intact. GCS 15. Moving all extremities without discomfort. Strength is 5/5 arms and legs bilaterally. Sensation intact in all four extremities. gait steady   Skin: Warm, dry and intact without evidence of rashes, bruising, pallor, jaundice or cyanosis.   Psych: Mood and affect appropriate. General: Patient is well developed and well nourised. Patient is alert and oriented to person, place and date. Patient is laying comfortably in stretcher and appears in no acute distress.  HEENT: Head is normocephalic and atraumatic. Pupils are equal, round and reactive. Extraocular movements intact. No evidence of nystagmus, conjunctival injection, or scleral icterus. External ears symmetric without evidence of discharge.  Nose is symmetric, non-tender, patent without evidence of discharge. Teeth in good repair. Uvula midline.   Neck: Supple with no evidence of lymphadenopathy.  Full range of motion.  Heart: Regular rate and rhythm. No murmurs, rubs or gallops.   Lungs: Clear to auscultation bilaterally with equal chest expansion. No note of wheezes, rhonchi, rales. Equal chest expansion. No note of retractions.  Abdomen: ttp suprapubic region. Bowel sounds present in all four quadrants. Soft, non-tender, non-distended without signs of masses, rebound or guarding. No note of hepatosplenomegaly. No CVA tenderness bilaterally. Negative Ruano sign. No pain present over McBurney's point.   Female: No erythema, edema, lesions, ulcers present on labia or perineal area. Vaginal vault pink and moist with clear discharge. Non-malodorous. Cervix unable to be examined due to blood, with evidence of blood, no ulceration or lesion. No CMT. Ovaries are non-palpable and uterus small. No evidence of inguinal lymphadenopathy.   Musculoskeletal: No edema, erythema, ecchymosis, atrophy or deformity. Full range of motion in all four extremities.  No clubbing or cyanosis. No point tenderness to palpation.   Neuro: Cranial nerves II-XII intact. GCS 15. Moving all extremities without discomfort. Strength is 5/5 arms and legs bilaterally. Sensation intact in all four extremities. gait steady   Skin: Warm, dry and intact without evidence of rashes, bruising, pallor, jaundice or cyanosis.   Psych: Mood and affect appropriate.

## 2018-12-19 NOTE — ED PROVIDER NOTE - CHIEF COMPLAINT
The patient is a 43y Female pmhx endometriosis complaining of abdominal pain. The patient is a 43y Female pmhx gastric ulcer complaining of abdominal pain.

## 2020-02-18 NOTE — ED ADULT NURSE NOTE - CAS ELECT INFOMATION PROVIDED
Per stroke protocol and stroke committee recommendation, patient is on bedrest for 24 hrs from ADT time of 13:42 on 2/17. OT will evaluate the patient once activity level is upgraded. DC instructions

## 2021-06-26 ENCOUNTER — EMERGENCY (EMERGENCY)
Facility: HOSPITAL | Age: 46
LOS: 1 days | Discharge: HOME | End: 2021-06-26
Attending: EMERGENCY MEDICINE | Admitting: EMERGENCY MEDICINE
Payer: MEDICARE

## 2021-06-26 VITALS
HEART RATE: 52 BPM | RESPIRATION RATE: 16 BRPM | TEMPERATURE: 98 F | WEIGHT: 199.96 LBS | OXYGEN SATURATION: 100 % | HEIGHT: 71 IN | SYSTOLIC BLOOD PRESSURE: 174 MMHG | DIASTOLIC BLOOD PRESSURE: 91 MMHG

## 2021-06-26 VITALS
HEART RATE: 61 BPM | TEMPERATURE: 98 F | DIASTOLIC BLOOD PRESSURE: 80 MMHG | SYSTOLIC BLOOD PRESSURE: 159 MMHG | OXYGEN SATURATION: 100 % | RESPIRATION RATE: 18 BRPM

## 2021-06-26 DIAGNOSIS — R11.2 NAUSEA WITH VOMITING, UNSPECIFIED: ICD-10-CM

## 2021-06-26 DIAGNOSIS — N80.9 ENDOMETRIOSIS, UNSPECIFIED: ICD-10-CM

## 2021-06-26 DIAGNOSIS — Z20.822 CONTACT WITH AND (SUSPECTED) EXPOSURE TO COVID-19: ICD-10-CM

## 2021-06-26 DIAGNOSIS — R10.84 GENERALIZED ABDOMINAL PAIN: ICD-10-CM

## 2021-06-26 DIAGNOSIS — Z97.5 PRESENCE OF (INTRAUTERINE) CONTRACEPTIVE DEVICE: ICD-10-CM

## 2021-06-26 DIAGNOSIS — R00.1 BRADYCARDIA, UNSPECIFIED: ICD-10-CM

## 2021-06-26 DIAGNOSIS — Z79.3 LONG TERM (CURRENT) USE OF HORMONAL CONTRACEPTIVES: ICD-10-CM

## 2021-06-26 LAB
ALBUMIN SERPL ELPH-MCNC: 4.1 G/DL — SIGNIFICANT CHANGE UP (ref 3.3–5)
ALP SERPL-CCNC: 67 U/L — SIGNIFICANT CHANGE UP (ref 40–120)
ALT FLD-CCNC: 20 U/L — SIGNIFICANT CHANGE UP (ref 10–45)
ANION GAP SERPL CALC-SCNC: 17 MMOL/L — SIGNIFICANT CHANGE UP (ref 5–17)
APPEARANCE UR: ABNORMAL
APTT BLD: 33.4 SEC — SIGNIFICANT CHANGE UP (ref 27.5–35.5)
AST SERPL-CCNC: 23 U/L — SIGNIFICANT CHANGE UP (ref 10–40)
BASOPHILS # BLD AUTO: 0.02 K/UL — SIGNIFICANT CHANGE UP (ref 0–0.2)
BASOPHILS NFR BLD AUTO: 0.2 % — SIGNIFICANT CHANGE UP (ref 0–2)
BILIRUB SERPL-MCNC: 0.7 MG/DL — SIGNIFICANT CHANGE UP (ref 0.2–1.2)
BILIRUB UR-MCNC: NEGATIVE — SIGNIFICANT CHANGE UP
BLD GP AB SCN SERPL QL: NEGATIVE — SIGNIFICANT CHANGE UP
BUN SERPL-MCNC: 9 MG/DL — SIGNIFICANT CHANGE UP (ref 7–23)
CALCIUM SERPL-MCNC: 8.7 MG/DL — SIGNIFICANT CHANGE UP (ref 8.4–10.5)
CHLORIDE SERPL-SCNC: 103 MMOL/L — SIGNIFICANT CHANGE UP (ref 96–108)
CO2 SERPL-SCNC: 20 MMOL/L — LOW (ref 22–31)
COLOR SPEC: SIGNIFICANT CHANGE UP
CREAT SERPL-MCNC: 0.69 MG/DL — SIGNIFICANT CHANGE UP (ref 0.5–1.3)
DIFF PNL FLD: ABNORMAL
EOSINOPHIL # BLD AUTO: 0 K/UL — SIGNIFICANT CHANGE UP (ref 0–0.5)
EOSINOPHIL NFR BLD AUTO: 0 % — SIGNIFICANT CHANGE UP (ref 0–6)
GLUCOSE SERPL-MCNC: 165 MG/DL — HIGH (ref 70–99)
GLUCOSE UR QL: 100
HCG SERPL-ACNC: <0 MIU/ML — SIGNIFICANT CHANGE UP
HCT VFR BLD CALC: 37 % — SIGNIFICANT CHANGE UP (ref 34.5–45)
HGB BLD-MCNC: 11.7 G/DL — SIGNIFICANT CHANGE UP (ref 11.5–15.5)
IMM GRANULOCYTES NFR BLD AUTO: 0.3 % — SIGNIFICANT CHANGE UP (ref 0–1.5)
INR BLD: 1.15 — SIGNIFICANT CHANGE UP (ref 0.88–1.16)
KETONES UR-MCNC: 40 MG/DL
LACTATE SERPL-SCNC: 2.9 MMOL/L — HIGH (ref 0.5–2)
LEUKOCYTE ESTERASE UR-ACNC: NEGATIVE — SIGNIFICANT CHANGE UP
LIDOCAIN IGE QN: 34 U/L — SIGNIFICANT CHANGE UP (ref 7–60)
LYMPHOCYTES # BLD AUTO: 0.51 K/UL — LOW (ref 1–3.3)
LYMPHOCYTES # BLD AUTO: 5.5 % — LOW (ref 13–44)
MCHC RBC-ENTMCNC: 30.1 PG — SIGNIFICANT CHANGE UP (ref 27–34)
MCHC RBC-ENTMCNC: 31.6 GM/DL — LOW (ref 32–36)
MCV RBC AUTO: 95.1 FL — SIGNIFICANT CHANGE UP (ref 80–100)
MONOCYTES # BLD AUTO: 0.52 K/UL — SIGNIFICANT CHANGE UP (ref 0–0.9)
MONOCYTES NFR BLD AUTO: 5.6 % — SIGNIFICANT CHANGE UP (ref 2–14)
NEUTROPHILS # BLD AUTO: 8.17 K/UL — HIGH (ref 1.8–7.4)
NEUTROPHILS NFR BLD AUTO: 88.4 % — HIGH (ref 43–77)
NITRITE UR-MCNC: NEGATIVE — SIGNIFICANT CHANGE UP
NRBC # BLD: 0 /100 WBCS — SIGNIFICANT CHANGE UP (ref 0–0)
PH UR: 6 — SIGNIFICANT CHANGE UP (ref 5–8)
PLATELET # BLD AUTO: 199 K/UL — SIGNIFICANT CHANGE UP (ref 150–400)
POTASSIUM SERPL-MCNC: 3.6 MMOL/L — SIGNIFICANT CHANGE UP (ref 3.5–5.3)
POTASSIUM SERPL-SCNC: 3.6 MMOL/L — SIGNIFICANT CHANGE UP (ref 3.5–5.3)
PROT SERPL-MCNC: 7.7 G/DL — SIGNIFICANT CHANGE UP (ref 6–8.3)
PROT UR-MCNC: NEGATIVE MG/DL — SIGNIFICANT CHANGE UP
PROTHROM AB SERPL-ACNC: 13.7 SEC — HIGH (ref 10.6–13.6)
RBC # BLD: 3.89 M/UL — SIGNIFICANT CHANGE UP (ref 3.8–5.2)
RBC # FLD: 11.9 % — SIGNIFICANT CHANGE UP (ref 10.3–14.5)
RH IG SCN BLD-IMP: POSITIVE — SIGNIFICANT CHANGE UP
SARS-COV-2 RNA SPEC QL NAA+PROBE: SIGNIFICANT CHANGE UP
SODIUM SERPL-SCNC: 140 MMOL/L — SIGNIFICANT CHANGE UP (ref 135–145)
SP GR SPEC: 1.02 — SIGNIFICANT CHANGE UP (ref 1–1.03)
UROBILINOGEN FLD QL: 0.2 E.U./DL — SIGNIFICANT CHANGE UP
WBC # BLD: 9.25 K/UL — SIGNIFICANT CHANGE UP (ref 3.8–10.5)
WBC # FLD AUTO: 9.25 K/UL — SIGNIFICANT CHANGE UP (ref 3.8–10.5)

## 2021-06-26 PROCEDURE — 93010 ELECTROCARDIOGRAM REPORT: CPT

## 2021-06-26 PROCEDURE — 96375 TX/PRO/DX INJ NEW DRUG ADDON: CPT

## 2021-06-26 PROCEDURE — 87184 SC STD DISK METHOD PER PLATE: CPT

## 2021-06-26 PROCEDURE — 36415 COLL VENOUS BLD VENIPUNCTURE: CPT

## 2021-06-26 PROCEDURE — 84702 CHORIONIC GONADOTROPIN TEST: CPT

## 2021-06-26 PROCEDURE — 86901 BLOOD TYPING SEROLOGIC RH(D): CPT

## 2021-06-26 PROCEDURE — 99284 EMERGENCY DEPT VISIT MOD MDM: CPT

## 2021-06-26 PROCEDURE — 85610 PROTHROMBIN TIME: CPT

## 2021-06-26 PROCEDURE — 80053 COMPREHEN METABOLIC PANEL: CPT

## 2021-06-26 PROCEDURE — 81001 URINALYSIS AUTO W/SCOPE: CPT

## 2021-06-26 PROCEDURE — 83605 ASSAY OF LACTIC ACID: CPT

## 2021-06-26 PROCEDURE — 87635 SARS-COV-2 COVID-19 AMP PRB: CPT

## 2021-06-26 PROCEDURE — 86900 BLOOD TYPING SEROLOGIC ABO: CPT

## 2021-06-26 PROCEDURE — 96376 TX/PRO/DX INJ SAME DRUG ADON: CPT

## 2021-06-26 PROCEDURE — 83690 ASSAY OF LIPASE: CPT

## 2021-06-26 PROCEDURE — 99284 EMERGENCY DEPT VISIT MOD MDM: CPT | Mod: 25

## 2021-06-26 PROCEDURE — 96361 HYDRATE IV INFUSION ADD-ON: CPT

## 2021-06-26 PROCEDURE — 93005 ELECTROCARDIOGRAM TRACING: CPT

## 2021-06-26 PROCEDURE — 85730 THROMBOPLASTIN TIME PARTIAL: CPT

## 2021-06-26 PROCEDURE — 86850 RBC ANTIBODY SCREEN: CPT

## 2021-06-26 PROCEDURE — 87086 URINE CULTURE/COLONY COUNT: CPT

## 2021-06-26 PROCEDURE — 82962 GLUCOSE BLOOD TEST: CPT

## 2021-06-26 PROCEDURE — 96374 THER/PROPH/DIAG INJ IV PUSH: CPT

## 2021-06-26 PROCEDURE — 85025 COMPLETE CBC W/AUTO DIFF WBC: CPT

## 2021-06-26 RX ORDER — HYDROMORPHONE HYDROCHLORIDE 2 MG/ML
1 INJECTION INTRAMUSCULAR; INTRAVENOUS; SUBCUTANEOUS ONCE
Refills: 0 | Status: DISCONTINUED | OUTPATIENT
Start: 2021-06-26 | End: 2021-06-26

## 2021-06-26 RX ORDER — SODIUM CHLORIDE 9 MG/ML
1000 INJECTION INTRAMUSCULAR; INTRAVENOUS; SUBCUTANEOUS ONCE
Refills: 0 | Status: COMPLETED | OUTPATIENT
Start: 2021-06-26 | End: 2021-06-26

## 2021-06-26 RX ORDER — SODIUM CHLORIDE 9 MG/ML
500 INJECTION INTRAMUSCULAR; INTRAVENOUS; SUBCUTANEOUS ONCE
Refills: 0 | Status: COMPLETED | OUTPATIENT
Start: 2021-06-26 | End: 2021-06-26

## 2021-06-26 RX ORDER — FAMOTIDINE 10 MG/ML
20 INJECTION INTRAVENOUS ONCE
Refills: 0 | Status: COMPLETED | OUTPATIENT
Start: 2021-06-26 | End: 2021-06-26

## 2021-06-26 RX ORDER — METOCLOPRAMIDE HCL 10 MG
10 TABLET ORAL ONCE
Refills: 0 | Status: COMPLETED | OUTPATIENT
Start: 2021-06-26 | End: 2021-06-26

## 2021-06-26 RX ORDER — ONDANSETRON 8 MG/1
4 TABLET, FILM COATED ORAL ONCE
Refills: 0 | Status: COMPLETED | OUTPATIENT
Start: 2021-06-26 | End: 2021-06-26

## 2021-06-26 RX ORDER — HYDROMORPHONE HYDROCHLORIDE 2 MG/ML
0.5 INJECTION INTRAMUSCULAR; INTRAVENOUS; SUBCUTANEOUS ONCE
Refills: 0 | Status: DISCONTINUED | OUTPATIENT
Start: 2021-06-26 | End: 2021-06-26

## 2021-06-26 RX ADMIN — HYDROMORPHONE HYDROCHLORIDE 1 MILLIGRAM(S): 2 INJECTION INTRAMUSCULAR; INTRAVENOUS; SUBCUTANEOUS at 17:11

## 2021-06-26 RX ADMIN — SODIUM CHLORIDE 500 MILLILITER(S): 9 INJECTION INTRAMUSCULAR; INTRAVENOUS; SUBCUTANEOUS at 20:44

## 2021-06-26 RX ADMIN — FAMOTIDINE 20 MILLIGRAM(S): 10 INJECTION INTRAVENOUS at 17:11

## 2021-06-26 RX ADMIN — SODIUM CHLORIDE 1000 MILLILITER(S): 9 INJECTION INTRAMUSCULAR; INTRAVENOUS; SUBCUTANEOUS at 20:45

## 2021-06-26 RX ADMIN — HYDROMORPHONE HYDROCHLORIDE 0.5 MILLIGRAM(S): 2 INJECTION INTRAMUSCULAR; INTRAVENOUS; SUBCUTANEOUS at 20:45

## 2021-06-26 RX ADMIN — HYDROMORPHONE HYDROCHLORIDE 1 MILLIGRAM(S): 2 INJECTION INTRAMUSCULAR; INTRAVENOUS; SUBCUTANEOUS at 17:38

## 2021-06-26 RX ADMIN — SODIUM CHLORIDE 1000 MILLILITER(S): 9 INJECTION INTRAMUSCULAR; INTRAVENOUS; SUBCUTANEOUS at 17:59

## 2021-06-26 RX ADMIN — ONDANSETRON 4 MILLIGRAM(S): 8 TABLET, FILM COATED ORAL at 17:11

## 2021-06-26 RX ADMIN — Medication 10 MILLIGRAM(S): at 20:16

## 2021-06-26 RX ADMIN — HYDROMORPHONE HYDROCHLORIDE 0.5 MILLIGRAM(S): 2 INJECTION INTRAMUSCULAR; INTRAVENOUS; SUBCUTANEOUS at 20:16

## 2021-06-26 RX ADMIN — SODIUM CHLORIDE 1000 MILLILITER(S): 9 INJECTION INTRAMUSCULAR; INTRAVENOUS; SUBCUTANEOUS at 17:11

## 2021-06-26 NOTE — ED PROVIDER NOTE - PHYSICAL EXAMINATION
GEN: Well appearing, overweight, awake, alert, oriented to person, place, time/situation, actively vomiting clear phlegm and bile, no blood, appears uncomfortable 2/2 nausea. NTAF  ENT: Airway patent, Nasal mucosa clear. Mouth with somewhat dry mucosa.  EYES: Clear bilaterally. PERRL, EOMI  RESPIRATORY: Breathing comfortably with normal RR. No W/C/R, no hypoxia or resp distress.  CARDIAC: Regular rate and rhythm, no M/R/G  ABDOMEN: Soft, nontender, +bowel sounds, no rebound, rigidity, or guarding.  MSK: Range of motion is not limited, no deformities noted.  NEURO: Alert and oriented, no focal deficits.  SKIN: Skin normal color for race, warm, dry and intact. No evidence of rash.  PSYCH: Alert and oriented to person, place, time/situation. normal mood and affect. no apparent risk to self or others.

## 2021-06-26 NOTE — ED PROVIDER NOTE - NSFOLLOWUPINSTRUCTIONS_ED_ALL_ED_FT
Please follow up with your primary care physician and gynecologist. If you have any problem getting an appointment this week, please call the ED Referral Coordinator at [phone].  Return to the Emergency Department if you have any new or worsening symptoms, or for any other concerns. Please read below for further information.      Acute Nausea and Vomiting    WHAT YOU NEED TO KNOW:    Acute nausea and vomiting start suddenly, worsen quickly, and last a short time.    DISCHARGE INSTRUCTIONS:    Return to the emergency department if:   •You see blood in your vomit or your bowel movements.      •You have sudden, severe pain in your chest and upper abdomen after hard vomiting or retching.      •You have swelling in your neck and chest.       •You are dizzy, cold, and thirsty and your eyes and mouth are dry.      •You are urinating very little or not at all.      •You have muscle weakness, leg cramps, and trouble breathing.       •Your heart is beating much faster than normal.       •You continue to vomit for more than 48 hours.       Contact your healthcare provider if:   •You have frequent dry heaves (vomiting but nothing comes out).      •Your nausea and vomiting does not get better or go away after you use medicine.      •You have questions or concerns about your condition or treatment.      Medicines: You may need any of the following:   •Medicines may be given to calm your stomach and stop your vomiting. You may also need medicines to help you feel more relaxed or to stop nausea and vomiting caused by motion sickness.      •Gastrointestinal stimulants are used to help empty your stomach and bowels. This may help decrease nausea and vomiting.      •Take your medicine as directed. Contact your healthcare provider if you think your medicine is not helping or if you have side effects. Tell him or her if you are allergic to any medicine. Keep a list of the medicines, vitamins, and herbs you take. Include the amounts, and when and why you take them. Bring the list or the pill bottles to follow-up visits. Carry your medicine list with you in case of an emergency.      Prevent or manage acute nausea and vomiting:   •Do not drink alcohol. Alcohol may upset or irritate your stomach. Too much alcohol can also cause acute nausea and vomiting.      •Control stress. Headaches due to stress may cause nausea and vomiting. Find ways to relax and manage your stress. Get more rest and sleep.      •Drink more liquids as directed. Vomiting can lead to dehydration. It is important to drink more liquids to help replace lost body fluids. Ask your healthcare provider how much liquid to drink each day and which liquids are best for you. Your provider may recommend that you drink an oral rehydration solution (ORS). ORS contains water, salts, and sugar that are needed to replace the lost body fluids. Ask what kind of ORS to use, how much to drink, and where to get it.      •Eat smaller meals, more often. Eat small amounts of food every 2 to 3 hours, even if you are not hungry. Food in your stomach may decrease your nausea.      •Talk to your healthcare provider before you take over-the-counter (OTC) medicines. These medicines can cause serious problems if you use certain other medicines, or you have a medical condition. You may have problems if you use too much or use them for longer than the label says. Follow directions on the label carefully.       Follow up with your healthcare provider as directed: Write down your questions so you remember to ask them during your follow-up visits.      Endometriosis    WHAT YOU NEED TO KNOW:    Endometriosis is a condition in which tissue that is normally only in your uterus grows outside of the uterus. Endometriosis causes tissue that should be shed during a monthly period to grow on your ovaries, fallopian tubes, bladder, or other organs. Organs and tissue may stick together and cause inflammation and pain.    Female Reproductive System         DISCHARGE INSTRUCTIONS:    Return to the emergency department if:   •You have severe pain that does not go away after you take pain medicine.          Contact your healthcare provider if:   •Your symptoms return after treatment.      •You have heavy or unusual vaginal bleeding.      •You see blood in your urine or bowel movement.      •You have questions or concerns about your condition or care.      Medicines:   •Hormones may help shrink endometrial tissue and decrease pain and inflammation. You may be given birth control pills, androgen hormones, or medicine that makes your body produce less of certain hormones.      •Acetaminophen decreases pain and is available without a doctor's order. Ask how much to take and how often to take it. Follow directions. Acetaminophen can cause liver damage if not taken correctly.      •NSAIDs, such as ibuprofen, help decrease swelling, pain, and fever. This medicine is available with or without a doctor's order. NSAIDs can cause stomach bleeding or kidney problems in certain people. If you take blood thinner medicine, always ask your healthcare provider if NSAIDs are safe for you. Always read the medicine label and follow directions.      •Take your medicine as directed. Contact your healthcare provider if you think your medicine is not helping or if you have side effects. Tell him or her if you are allergic to any medicine. Keep a list of the medicines, vitamins, and herbs you take. Include the amounts, and when and why you take them. Bring the list or the pill bottles to follow-up visits. Carry your medicine list with you in case of an emergency.      Self-care:   •Apply heat on your abdomen for 20 to 30 minutes every 2 hours for as many days as directed. Heat helps decrease pain and muscle spasms.      •Exercise regularly to help reduce symptoms, such as pain. Ask about the best exercise plan for you.   FAMILY WALKING FOR EXERCISE           Follow up with your healthcare provider as directed: Write down your questions so you remember to ask them during your visits.

## 2021-06-26 NOTE — ED PROVIDER NOTE - CARE PLAN
Principal Discharge DX:	Nausea and vomiting   Principal Discharge DX:	Nausea and vomiting  Secondary Diagnosis:	Endometriosis

## 2021-06-26 NOTE — ED ADULT TRIAGE NOTE - HEIGHT IN FEET
Cardiovascular/Thoracic Surgery Transfer of Care:  5/15/2019    Cardiologist:  MD Judson     Reason for Consultation:  Exposed ICD    Chief Complaint:  Visible Defibrillator    HPI:  This is an 84 year old male with past medical history significant for Ischemic Cardiomyopathy, CAD, history of CABG procedure in 2004 in [place], HTN, HLD, CKD Stage 3, and chronic atrial fibrillation on Eliquis. He followed up with his PCP last week and was placed on abx due to infectious concerns and has two dosages left to complete. He presented to the ED after being advised by his PCP due to exposed ICD hardware. Currently he denies any fevers/chills, recent ICD shocks, chest pain, shortness of breath, palpitations, diaphoresis, dizziness, or syncope. Dr. Carson has been consulted for surgical opinion.            Past Medical History    Ischemic cardiomyopathy                                       MI (myocardial infarction) (CMS/Formerly [facility])                            Comment: inferoapical    CAD (coronary artery disease)                                 Carotid artery stenosis                                         Comment: carotid duplex 11/07 shows 30-40% stenosis                bilat    Hyperlipidemia                                                CHF (congestive heart failure) (CMS/Formerly [facility])                      Hypertension                                                  Chronic atrial fibrillation (CMS/Formerly [facility])                         CKD (chronic kidney disease) stage 3, GFR 30-5*               Past Surgical History    ECHO HEART RESTING                              12/2009         Comment: EF 30-35%    EP ICD IMPLANT                                  7/14/2010       Comment: ORIG 4/2004 IN [place];MEDT BIV ICD    CORONARY ARTERY BYPASS GRAFT                                  ALLERGIES:  No Known Allergies      Current Facility-Administered Medications   Medication   • sodium chloride (PF) 0.9 % injection 2 mL   • sodium chloride (PF) 0.9  % injection 2 mL   • sodium chloride (NORMAL SALINE) 0.9 % bolus 500 mL   • sodium chloride 0.9 % flush bag 500 mL   • amoxicillin-clavulanate (AUGMENTIN) 875-125 MG tablet 1 tablet   • allopurinol (ZYLOPRIM) tablet 150 mg   • [Held by provider] aspirin (ECOTRIN) enteric coated tablet 81 mg   • carvedilol (COREG) tablet 25 mg   • eplerenone (INSPRA) tablet 25 mg   • [START ON 5/16/2019] furosemide (LASIX) tablet 40 mg   • vitamin - therapeutic multivitamins w/minerals 1 tablet   • simvastatin (ZOCOR) tablet 40 mg   • [Held by provider] apixaBAN (ELIQUIS) tablet 2.5 mg   • lisinopril (ZESTRIL) tablet 40 mg   • potassium CHLORIDE (KLOR-CON M) jeanie ER tablet 20 mEq   • potassium CHLORIDE (KLOR-CON) packet 20 mEq   • potassium CHLORIDE 20 mEq/100mL IVPB premix   • potassium CHLORIDE (KLOR-CON M) jeanie ER tablet 40 mEq   • potassium CHLORIDE (KLOR-CON) packet 40 mEq   • potassium CHLORIDE 20 mEq/100mL IVPB premix   • magnesium sulfate 1 g in dextrose 5% 100 mL IVPB premix   • magnesium sulfate 2 g in 50 mL premix IVPB   • magnesium sulfate 2 g in 50 mL premix IVPB   • diphenhydrAMINE (BENADRYL) capsule 25 mg   • ondansetron (ZOFRAN) injection 2 mg   • acetaminophen (TYLENOL) tablet 650 mg   • morphine injection 2-4 mg   • hydrALAZINE (APRESOLINE) injection 10 mg       Social History     Tobacco Use   Smoking Status Never Smoker   Smokeless Tobacco Never Used       Social History     Substance and Sexual Activity   Alcohol Use Yes   • Alcohol/week: 0.0 oz       History   Drug Use Not on file       Occupation:  Retired   Living situation:  With Wife     Family History   Problem Relation Age of Onset   • Cancer Brother         colon, prostate   • Cancer Brother    • Heart Brother    • Heart disease Brother    • Heart disease Son      Family history reviewed.  Pertinent cardiac family history:  YES    Review of Systems:  10 point review of systems negative except for those mentioned above in HPI.       Physical Exam:    Visit  Vitals  /56 (BP Location: [facility] [place], Patient Position: Semi-Saez's)   Pulse 62   Temp 97.6 °F (36.4 °C) (Oral)   Resp 18   Ht 5' 10\" (1.778 m)   Wt 67.1 kg   SpO2 97%   BMI 21.24 kg/m²     Ht Readings from Last 1 Encounters:   05/15/19 5' 10\" (1.778 m)     Wt Readings from Last 1 Encounters:   05/15/19 67.1 kg     Body mass index is 21.24 kg/m².      General: male, no acute distress, frail  Eyes:  Normal sclerae, normal conjunctivae, EOMs intact  Mouth:  Dentition adequate repair, tongue midline, mucous membranes moist  Neck:  No trachea deviation/tenderness/masses  Cardiovascular:  Normal S1/S2, no murmur, no rub, and normal pedal pulses  Extremities:   bilateral lower extremity edema, no stasis changes, no varicose veins in bilateral lower extremities and normal strength in bilateral lower extremities   Respiratory:  No wheezing/crackles/rhonchi/stridor and no accessory muscle use or retractions  Abdomen:  No tenderness and no masses, non-distended, positive bowel sounds  Skin:  Warm/dry, no lesions, sternal incision well healed, Exposed defibrillator in the left axilla- no drainage, erythema, warmth, or pain to palpation   Neuro:  Alert and oriented x 3      Labs:    Lab Results   Component Value Date    SODIUM 142 05/15/2019    POTASSIUM 3.7 05/15/2019    CHLORIDE 108 (H) 05/15/2019    CO2 27 05/15/2019    GLUCOSE 81 05/15/2019    BUN 27 (H) 05/15/2019    CREATININE 1.55 (H) 05/15/2019    CALCIUM 8.9 05/15/2019    ALBUMIN 3.9 05/14/2019    BILIRUBIN 0.8 05/14/2019    AST 31 05/14/2019    INR 1.2 05/14/2019     Lab Results   Component Value Date    WBC 5.6 05/15/2019    HGB 9.1 (L) 05/15/2019    HCT 27.6 (L) 05/15/2019     (L) 05/15/2019     (H) 10/20/2016    TSH 5.08 (H) 02/15/2018       Diagnostics:  Echocardiogram 5/15/2019  In process     Impression:  Exposed Single Chamber ICD  CAD s/p CABG procedure in 2004  Atrial Fibrillation on Eliquis   CKD Stage III (Cr 1.55)        Plan:  Further plan  of care comments per Dr. Yamileth Rogers PA-C  5/15/2019      CARDIOTHORACIC SURGERY    I, Ganga Carson MD, have seen, examined, and evaluated this patient and agree with the data and physical exam as documented in the above note.  Erosion of ICD noted 10 days ago, admitted for further evaluation.  No fever/chills.  BiV ICD with capped RA lead due to AF rate 40-60.  Leads from 2004, prior sternotomy, on NOAC for AF.    Additional pertinent exam findings:   No erythema, tenderness or drainage.        Plan: OK to D/C, hold NOAC for 48 hours and readmit for ICD lead extraction with pump standby. Possible TVP, new implant on right side.  Anticipate OR 5/21/2019    Risks (including bleeding, death, infection, stroke and potential use of blood products), benefits and alternatives were discussed with the patient and his wife and he agrees to proceed with the plan as documented above.    D/W Valeria Carrasco and Adonay        5

## 2021-06-26 NOTE — ED ADULT TRIAGE NOTE - CHIEF COMPLAINT QUOTE
Pt reports vomiting starting at 10am today, has hx of stomach ulcer and "I usually vomit on the first day of my period and it started today." Pt also reports abd pain, denies diarrhea, chills, fever, chest pain.

## 2021-06-26 NOTE — ED ADULT NURSE NOTE - OBJECTIVE STATEMENT
Pt presents to ED w/ c/o abdominal pain, N,V, unable to keep anything down since this morning. Pt states usually gets really bad abdominal pain associated w/ N/V when she gets her period. Pt observed actively vomiting, appears pale, lethargic.

## 2021-06-26 NOTE — ED PROVIDER NOTE - PROGRESS NOTE DETAILS
Pt is feeling much better. Labs with no emergent findings. Suspect lactate of 2.9 due to prolonged turniquet time (pt hard stick). No indication for imaging or further workup at this time. Dietary and f/u instructions givnen. pt will f/u with her gyn on Monday,   The patient is stable for DC and is feeling much better.  Symptoms have improved and after discussion regarding discharge, patient feels comfortable going home.  Answers to all questions provided.  Patient feeling satisfactory with care and follow up plan discussed. They were advised to call their PMD for prompt outpatient follow up. Return precautions were discussed. The patient was advised to return to the ER for any concerning or worsening symptoms.

## 2021-06-26 NOTE — ED PROVIDER NOTE - OBJECTIVE STATEMENT
45F with a h/o endometriosis and PUD who p/w nausea, vomits and generalized abdominal discomfort in the setting of getting her period today. Pt states she frequently gets these symptoms with the onset of her periods, she has been on ocps and had an IUD in the past to control these symptoms, but currently is not on anything. She tried taking ODT zofran and tylenol at home PTA but was not able to keep these down due to n/v. Denies heavy bleeding at this time. No DC. No f/c, no cp/sob, no other complaints at this time.

## 2021-06-26 NOTE — ED PROVIDER NOTE - PATIENT PORTAL LINK FT
You can access the FollowMyHealth Patient Portal offered by [facility] by registering at the following website: [email or web address]. By joining [facility]’s FollowMyHealth portal, you will also be able to view your health information using other applications (apps) compatible with our system.

## 2021-06-27 ENCOUNTER — EMERGENCY (EMERGENCY)
Facility: HOSPITAL | Age: 46
LOS: 1 days | Discharge: HOME | End: 2021-06-27
Attending: EMERGENCY MEDICINE | Admitting: EMERGENCY MEDICINE
Payer: SELF-PAY

## 2021-06-27 VITALS
OXYGEN SATURATION: 97 % | WEIGHT: 199.96 LBS | HEIGHT: 71 IN | HEART RATE: 91 BPM | RESPIRATION RATE: 20 BRPM | DIASTOLIC BLOOD PRESSURE: 94 MMHG | SYSTOLIC BLOOD PRESSURE: 184 MMHG | TEMPERATURE: 97 F

## 2021-06-27 VITALS
RESPIRATION RATE: 18 BRPM | DIASTOLIC BLOOD PRESSURE: 88 MMHG | SYSTOLIC BLOOD PRESSURE: 136 MMHG | TEMPERATURE: 98 F | OXYGEN SATURATION: 98 % | HEART RATE: 88 BPM

## 2021-06-27 DIAGNOSIS — R10.9 UNSPECIFIED ABDOMINAL PAIN: ICD-10-CM

## 2021-06-27 DIAGNOSIS — N80.9 ENDOMETRIOSIS, UNSPECIFIED: ICD-10-CM

## 2021-06-27 DIAGNOSIS — K25.7 CHRONIC GASTRIC ULCER WITHOUT HEMORRHAGE OR PERFORATION: ICD-10-CM

## 2021-06-27 DIAGNOSIS — R10.13 EPIGASTRIC PAIN: ICD-10-CM

## 2021-06-27 DIAGNOSIS — R11.2 NAUSEA WITH VOMITING, UNSPECIFIED: ICD-10-CM

## 2021-06-27 LAB
ALBUMIN SERPL ELPH-MCNC: 4.4 G/DL — SIGNIFICANT CHANGE UP (ref 3.3–5)
ALP SERPL-CCNC: 71 U/L — SIGNIFICANT CHANGE UP (ref 40–120)
ALT FLD-CCNC: 22 U/L — SIGNIFICANT CHANGE UP (ref 10–45)
ANION GAP SERPL CALC-SCNC: 17 MMOL/L — SIGNIFICANT CHANGE UP (ref 5–17)
AST SERPL-CCNC: 25 U/L — SIGNIFICANT CHANGE UP (ref 10–40)
BASOPHILS # BLD AUTO: 0.01 K/UL — SIGNIFICANT CHANGE UP (ref 0–0.2)
BASOPHILS NFR BLD AUTO: 0.1 % — SIGNIFICANT CHANGE UP (ref 0–2)
BILIRUB SERPL-MCNC: 0.6 MG/DL — SIGNIFICANT CHANGE UP (ref 0.2–1.2)
BUN SERPL-MCNC: 8 MG/DL — SIGNIFICANT CHANGE UP (ref 7–23)
CALCIUM SERPL-MCNC: 9.4 MG/DL — SIGNIFICANT CHANGE UP (ref 8.4–10.5)
CHLORIDE SERPL-SCNC: 98 MMOL/L — SIGNIFICANT CHANGE UP (ref 96–108)
CO2 SERPL-SCNC: 20 MMOL/L — LOW (ref 22–31)
CREAT SERPL-MCNC: 0.68 MG/DL — SIGNIFICANT CHANGE UP (ref 0.5–1.3)
EOSINOPHIL # BLD AUTO: 0 K/UL — SIGNIFICANT CHANGE UP (ref 0–0.5)
EOSINOPHIL NFR BLD AUTO: 0 % — SIGNIFICANT CHANGE UP (ref 0–6)
GLUCOSE SERPL-MCNC: 147 MG/DL — HIGH (ref 70–99)
HCG SERPL-ACNC: <0 MIU/ML — SIGNIFICANT CHANGE UP
HCT VFR BLD CALC: 40.2 % — SIGNIFICANT CHANGE UP (ref 34.5–45)
HGB BLD-MCNC: 13.1 G/DL — SIGNIFICANT CHANGE UP (ref 11.5–15.5)
IMM GRANULOCYTES NFR BLD AUTO: 0.5 % — SIGNIFICANT CHANGE UP (ref 0–1.5)
LACTATE SERPL-SCNC: 1.7 MMOL/L — SIGNIFICANT CHANGE UP (ref 0.5–2)
LIDOCAIN IGE QN: 62 U/L — HIGH (ref 7–60)
LYMPHOCYTES # BLD AUTO: 1.01 K/UL — SIGNIFICANT CHANGE UP (ref 1–3.3)
LYMPHOCYTES # BLD AUTO: 9.6 % — LOW (ref 13–44)
MCHC RBC-ENTMCNC: 30 PG — SIGNIFICANT CHANGE UP (ref 27–34)
MCHC RBC-ENTMCNC: 32.6 GM/DL — SIGNIFICANT CHANGE UP (ref 32–36)
MCV RBC AUTO: 92.2 FL — SIGNIFICANT CHANGE UP (ref 80–100)
MONOCYTES # BLD AUTO: 1.28 K/UL — HIGH (ref 0–0.9)
MONOCYTES NFR BLD AUTO: 12.2 % — SIGNIFICANT CHANGE UP (ref 2–14)
NEUTROPHILS # BLD AUTO: 8.17 K/UL — HIGH (ref 1.8–7.4)
NEUTROPHILS NFR BLD AUTO: 77.6 % — HIGH (ref 43–77)
NRBC # BLD: 0 /100 WBCS — SIGNIFICANT CHANGE UP (ref 0–0)
PLATELET # BLD AUTO: 232 K/UL — SIGNIFICANT CHANGE UP (ref 150–400)
POTASSIUM SERPL-MCNC: 3.6 MMOL/L — SIGNIFICANT CHANGE UP (ref 3.5–5.3)
POTASSIUM SERPL-SCNC: 3.6 MMOL/L — SIGNIFICANT CHANGE UP (ref 3.5–5.3)
PROT SERPL-MCNC: 8.1 G/DL — SIGNIFICANT CHANGE UP (ref 6–8.3)
RBC # BLD: 4.36 M/UL — SIGNIFICANT CHANGE UP (ref 3.8–5.2)
RBC # FLD: 11.9 % — SIGNIFICANT CHANGE UP (ref 10.3–14.5)
SODIUM SERPL-SCNC: 135 MMOL/L — SIGNIFICANT CHANGE UP (ref 135–145)
WBC # BLD: 10.52 K/UL — HIGH (ref 3.8–10.5)
WBC # FLD AUTO: 10.52 K/UL — HIGH (ref 3.8–10.5)

## 2021-06-27 PROCEDURE — 99284 EMERGENCY DEPT VISIT MOD MDM: CPT | Mod: 25

## 2021-06-27 PROCEDURE — 85025 COMPLETE CBC W/AUTO DIFF WBC: CPT

## 2021-06-27 PROCEDURE — 76830 TRANSVAGINAL US NON-OB: CPT

## 2021-06-27 PROCEDURE — 99053 MED SERV 10PM-8AM 24 HR FAC: CPT

## 2021-06-27 PROCEDURE — 76856 US EXAM PELVIC COMPLETE: CPT

## 2021-06-27 PROCEDURE — 36415 COLL VENOUS BLD VENIPUNCTURE: CPT

## 2021-06-27 PROCEDURE — 84702 CHORIONIC GONADOTROPIN TEST: CPT

## 2021-06-27 PROCEDURE — 83605 ASSAY OF LACTIC ACID: CPT

## 2021-06-27 PROCEDURE — 96374 THER/PROPH/DIAG INJ IV PUSH: CPT

## 2021-06-27 PROCEDURE — 96375 TX/PRO/DX INJ NEW DRUG ADDON: CPT

## 2021-06-27 PROCEDURE — 83690 ASSAY OF LIPASE: CPT

## 2021-06-27 PROCEDURE — 76830 TRANSVAGINAL US NON-OB: CPT | Mod: 26

## 2021-06-27 PROCEDURE — 80053 COMPREHEN METABOLIC PANEL: CPT

## 2021-06-27 PROCEDURE — 76856 US EXAM PELVIC COMPLETE: CPT | Mod: 26

## 2021-06-27 PROCEDURE — 99284 EMERGENCY DEPT VISIT MOD MDM: CPT

## 2021-06-27 RX ORDER — ONDANSETRON 8 MG/1
4 TABLET, FILM COATED ORAL ONCE
Refills: 0 | Status: COMPLETED | OUTPATIENT
Start: 2021-06-27 | End: 2021-06-27

## 2021-06-27 RX ORDER — MORPHINE SULFATE 50 MG/1
4 CAPSULE, EXTENDED RELEASE ORAL ONCE
Refills: 0 | Status: DISCONTINUED | OUTPATIENT
Start: 2021-06-27 | End: 2021-06-27

## 2021-06-27 RX ORDER — SODIUM CHLORIDE 9 MG/ML
1000 INJECTION INTRAMUSCULAR; INTRAVENOUS; SUBCUTANEOUS ONCE
Refills: 0 | Status: COMPLETED | OUTPATIENT
Start: 2021-06-27 | End: 2021-06-27

## 2021-06-27 RX ORDER — KETOROLAC TROMETHAMINE 30 MG/ML
15 SYRINGE (ML) INJECTION ONCE
Refills: 0 | Status: DISCONTINUED | OUTPATIENT
Start: 2021-06-27 | End: 2021-06-27

## 2021-06-27 RX ADMIN — SODIUM CHLORIDE 1000 MILLILITER(S): 9 INJECTION INTRAMUSCULAR; INTRAVENOUS; SUBCUTANEOUS at 07:13

## 2021-06-27 RX ADMIN — MORPHINE SULFATE 4 MILLIGRAM(S): 50 CAPSULE, EXTENDED RELEASE ORAL at 08:02

## 2021-06-27 RX ADMIN — ONDANSETRON 4 MILLIGRAM(S): 8 TABLET, FILM COATED ORAL at 07:00

## 2021-06-27 NOTE — ED PROVIDER NOTE - OBJECTIVE STATEMENT
45 year old female with history of endometriosis and PUD presents to for 2nd time in 24 hours with concern for pelvic cramping, nausea and emesis.  Patient notes she was feeling improved following initial visit, however symptoms quickly returned and she is again feeling nauseous and unable to keep anything down.  She denies associated fever, chills, chest pain, shortness of breath, changes to bowel movements, urinary symptoms, rashes, recent travel, known sick contacts or any additional acute complaints or concerns at this time.

## 2021-06-27 NOTE — ED PROVIDER NOTE - CLINICAL SUMMARY MEDICAL DECISION MAKING FREE TEXT BOX
Patient in ED w concern for persistent n/v/abd pain.  Patient non toxic, clear emesis in basin.  MIld reproducible epig tenderness.  Given anti emetic, toradol, IVF bolus and labs, US ordered.  Following shift completion, patient's care is handed over to oncoming day team pending review of labs, imaging and re eval.  Patient is stable at time of transfer of care.

## 2021-06-27 NOTE — ED ADULT NURSE NOTE - CAS ELECT INFOMATION PROVIDED
"1015- Pt reported \"I think my pancrease is acting up again because it starting hurting again after I had diarrhea this morning\". Pt pointed to mid upper abdomin and stated \"It hurts like before and it goes to my back\". Pt denies any other discomfort at this time. Pt VSS. Will continue to monitor and notify MD as needed. Will let endo know per MD request.  " DC instructions

## 2021-06-27 NOTE — ED PROVIDER NOTE - NSFOLLOWUPINSTRUCTIONS_ED_ALL_ED_FT
Gastritis    Gastritis is soreness and swelling (inflammation) of the lining of the stomach. Gastritis can develop as a sudden onset (acute) or long-term (chronic) condition. If gastritis is not treated, it can lead to stomach bleeding and ulcers. Causes include viral and bacterial infections, excessive alcohol consumption, tobacco use, or certain medications. Symptoms include nausea, vomiting, or abdominal pain or burning especially after eating. Avoid foods or drinks that make your symptoms worse such as caffeine, chocolate, spicy foods, acidic foods, or alcohol.    SEEK IMMEDIATE MEDICAL CARE IF YOU HAVE ANY OF THE FOLLOWING SYMPTOMS: black or bloody stools, blood or coffee-ground-colored vomitus, worsening abdominal pain, fever, or inability to keep fluids down.    Abdominal Pain    Many things can cause abdominal pain. Many times, abdominal pain is not caused by a disease and will improve without treatment. Your health care provider will do a physical exam to determine if there is a dangerous cause of your pain; blood tests and imaging may help determine the cause of your pain. However, in many cases, no cause may be found and you may need further testing as an outpatient. Monitor your abdominal pain for any changes.     SEEK IMMEDIATE MEDICAL CARE IF YOU HAVE ANY OF THE FOLLOWING SYMPTOMS: worsening abdominal pain, uncontrollable vomiting, profuse diarrhea, inability to have bowel movements or pass gas, black or bloody stools, fever accompanying chest pain or back pain, or fainting. These symptoms may represent a serious problem that is an emergency. Do not wait to see if the symptoms will go away. Get medical help right away. Call 911 and do not drive yourself to the hospital.

## 2021-06-27 NOTE — ED PROVIDER NOTE - PATIENT PORTAL LINK FT
You can access the FollowMyHealth Patient Portal offered by [facility] by registering at the following website: [email or web address]. By joining [facility]’s FollowMyHealth portal, you will also be able to view your health information using other applications (apps) compatible with our system.

## 2021-06-27 NOTE — ED PROVIDER NOTE - NS ED ROS FT
Constitutional: No fever or chills.   Eyes: No pain, blurry vision, or discharge.  ENMT: No hearing changes, pain, discharge or infections. No neck pain or stiffness.  Cardiac: No chest pain, SOB or edema. No chest pain with exertion.  Respiratory: No cough or respiratory distress. No hemoptysis. No history of asthma or RAD.  GI: + nausea, + vomiting, no diarrhea, no abdominal pain.  : No dysuria, frequency or burning.  MS: No myalgia, muscle weakness, joint pain or back pain.  Neuro: No headache or weakness. No LOC.  Skin: No skin rash.   Endocrine: No history of thyroid disease or diabetes.  Except as documented in the HPI, all other systems are negative.

## 2021-06-27 NOTE — ED ADULT TRIAGE NOTE - BP NONINVASIVE DIASTOLIC (MM HG)
When I went in to move Katy's bed upstairs she wrote me a note saying she wanted an  now. I passed the information on to the med/surg nurses and they got the [facility]  machine to the room immediately and will assess how she wants proceed.    94

## 2021-06-27 NOTE — ED PROVIDER NOTE - PHYSICAL EXAMINATION
VITAL SIGNS: I have reviewed nursing notes and confirm.  CONSTITUTIONAL: Well-developed; well-nourished; in no acute distress.   SKIN:  Warm and dry, no acute rash.   HEAD:  normocephalic, atraumatic.  EYES: PERRL.  EOM intact; conjunctiva and sclera clear.  ENT: No nasal discharge; airway clear.   NECK: Supple; non tender.  CARD: S1, S2 normal; no murmurs, gallops, or rubs. Regular rate and rhythm.   RESP:  Clear to auscultation b/l, no wheezes, rales or rhonchi.  ABD: Normal bowel sounds; soft; non-distended; mild epigastric discomfort on palpation, no reproducible pain on palpation to lower abdomen; no guarding/rebound.  EXT: Normal ROM. No clubbing, cyanosis or edema. 2+ pulses to b/l ue/le.  NEURO: Alert, oriented, grossly unremarkable.  5/5 strength x 4 extremities against gravity and external force.  No drift x 4 extremities.  Sensation intact and symmetric x 4 extremities.  No facial asymmetry.    PSYCH: Cooperative, mood and affect appropriate.

## 2021-06-29 LAB
-  AMPICILLIN: SIGNIFICANT CHANGE UP
-  CLINDAMYCIN: SIGNIFICANT CHANGE UP
-  ERYTHROMYCIN: SIGNIFICANT CHANGE UP
-  LEVOFLOXACIN: SIGNIFICANT CHANGE UP
-  PENICILLIN: SIGNIFICANT CHANGE UP
-  VANCOMYCIN: SIGNIFICANT CHANGE UP
CULTURE RESULTS: SIGNIFICANT CHANGE UP
METHOD TYPE: SIGNIFICANT CHANGE UP
ORGANISM # SPEC MICROSCOPIC CNT: SIGNIFICANT CHANGE UP
ORGANISM # SPEC MICROSCOPIC CNT: SIGNIFICANT CHANGE UP
SPECIMEN SOURCE: SIGNIFICANT CHANGE UP

## 2021-08-27 ENCOUNTER — EMERGENCY (EMERGENCY)
Facility: HOSPITAL | Age: 46
LOS: 1 days | Discharge: HOME | End: 2021-08-27
Attending: EMERGENCY MEDICINE | Admitting: EMERGENCY MEDICINE
Payer: SELF-PAY

## 2021-08-27 VITALS
HEART RATE: 67 BPM | TEMPERATURE: 99 F | RESPIRATION RATE: 16 BRPM | SYSTOLIC BLOOD PRESSURE: 152 MMHG | OXYGEN SATURATION: 98 % | DIASTOLIC BLOOD PRESSURE: 79 MMHG

## 2021-08-27 VITALS
HEART RATE: 58 BPM | TEMPERATURE: 98 F | RESPIRATION RATE: 17 BRPM | WEIGHT: 190.04 LBS | HEIGHT: 71 IN | SYSTOLIC BLOOD PRESSURE: 185 MMHG | DIASTOLIC BLOOD PRESSURE: 99 MMHG | OXYGEN SATURATION: 98 %

## 2021-08-27 DIAGNOSIS — N80.9 ENDOMETRIOSIS, UNSPECIFIED: ICD-10-CM

## 2021-08-27 DIAGNOSIS — R11.2 NAUSEA WITH VOMITING, UNSPECIFIED: ICD-10-CM

## 2021-08-27 DIAGNOSIS — Z97.5 PRESENCE OF (INTRAUTERINE) CONTRACEPTIVE DEVICE: ICD-10-CM

## 2021-08-27 DIAGNOSIS — K25.7 CHRONIC GASTRIC ULCER WITHOUT HEMORRHAGE OR PERFORATION: ICD-10-CM

## 2021-08-27 DIAGNOSIS — R10.13 EPIGASTRIC PAIN: ICD-10-CM

## 2021-08-27 LAB
ALBUMIN SERPL ELPH-MCNC: 4.4 G/DL — SIGNIFICANT CHANGE UP (ref 3.3–5)
ALP SERPL-CCNC: 75 U/L — SIGNIFICANT CHANGE UP (ref 40–120)
ALT FLD-CCNC: 19 U/L — SIGNIFICANT CHANGE UP (ref 10–45)
ANION GAP SERPL CALC-SCNC: 15 MMOL/L — SIGNIFICANT CHANGE UP (ref 5–17)
AST SERPL-CCNC: 26 U/L — SIGNIFICANT CHANGE UP (ref 10–40)
BILIRUB SERPL-MCNC: 0.8 MG/DL — SIGNIFICANT CHANGE UP (ref 0.2–1.2)
BUN SERPL-MCNC: 15 MG/DL — SIGNIFICANT CHANGE UP (ref 7–23)
CALCIUM SERPL-MCNC: 9.6 MG/DL — SIGNIFICANT CHANGE UP (ref 8.4–10.5)
CHLORIDE SERPL-SCNC: 100 MMOL/L — SIGNIFICANT CHANGE UP (ref 96–108)
CO2 SERPL-SCNC: 22 MMOL/L — SIGNIFICANT CHANGE UP (ref 22–31)
CREAT SERPL-MCNC: 0.91 MG/DL — SIGNIFICANT CHANGE UP (ref 0.5–1.3)
GLUCOSE SERPL-MCNC: 116 MG/DL — HIGH (ref 70–99)
LIDOCAIN IGE QN: 29 U/L — SIGNIFICANT CHANGE UP (ref 7–60)
POTASSIUM SERPL-MCNC: 3.8 MMOL/L — SIGNIFICANT CHANGE UP (ref 3.5–5.3)
POTASSIUM SERPL-SCNC: 3.8 MMOL/L — SIGNIFICANT CHANGE UP (ref 3.5–5.3)
PROT SERPL-MCNC: 8.5 G/DL — HIGH (ref 6–8.3)
SODIUM SERPL-SCNC: 137 MMOL/L — SIGNIFICANT CHANGE UP (ref 135–145)

## 2021-08-27 PROCEDURE — 83690 ASSAY OF LIPASE: CPT

## 2021-08-27 PROCEDURE — 96374 THER/PROPH/DIAG INJ IV PUSH: CPT

## 2021-08-27 PROCEDURE — 36415 COLL VENOUS BLD VENIPUNCTURE: CPT

## 2021-08-27 PROCEDURE — 85025 COMPLETE CBC W/AUTO DIFF WBC: CPT

## 2021-08-27 PROCEDURE — 99284 EMERGENCY DEPT VISIT MOD MDM: CPT | Mod: 25

## 2021-08-27 PROCEDURE — 99285 EMERGENCY DEPT VISIT HI MDM: CPT

## 2021-08-27 PROCEDURE — 80053 COMPREHEN METABOLIC PANEL: CPT

## 2021-08-27 PROCEDURE — 96375 TX/PRO/DX INJ NEW DRUG ADDON: CPT

## 2021-08-27 RX ORDER — DIPHENHYDRAMINE HCL 50 MG
25 CAPSULE ORAL ONCE
Refills: 0 | Status: COMPLETED | OUTPATIENT
Start: 2021-08-27 | End: 2021-08-27

## 2021-08-27 RX ORDER — SODIUM CHLORIDE 9 MG/ML
1000 INJECTION INTRAMUSCULAR; INTRAVENOUS; SUBCUTANEOUS ONCE
Refills: 0 | Status: COMPLETED | OUTPATIENT
Start: 2021-08-27 | End: 2021-08-27

## 2021-08-27 RX ORDER — SUCRALFATE 1 G
1 TABLET ORAL ONCE
Refills: 0 | Status: COMPLETED | OUTPATIENT
Start: 2021-08-27 | End: 2021-08-27

## 2021-08-27 RX ORDER — METOCLOPRAMIDE HCL 10 MG
10 TABLET ORAL ONCE
Refills: 0 | Status: COMPLETED | OUTPATIENT
Start: 2021-08-27 | End: 2021-08-27

## 2021-08-27 RX ORDER — MORPHINE SULFATE 50 MG/1
2 CAPSULE, EXTENDED RELEASE ORAL ONCE
Refills: 0 | Status: DISCONTINUED | OUTPATIENT
Start: 2021-08-27 | End: 2021-08-27

## 2021-08-27 RX ORDER — KETOROLAC TROMETHAMINE 30 MG/ML
30 SYRINGE (ML) INJECTION ONCE
Refills: 0 | Status: DISCONTINUED | OUTPATIENT
Start: 2021-08-27 | End: 2021-08-27

## 2021-08-27 RX ORDER — ONDANSETRON 8 MG/1
1 TABLET, FILM COATED ORAL
Qty: 9 | Refills: 0
Start: 2021-08-27 | End: 2021-08-29

## 2021-08-27 RX ORDER — SUCRALFATE 1 G
10 TABLET ORAL
Qty: 280 | Refills: 0
Start: 2021-08-27 | End: 2021-09-02

## 2021-08-27 RX ORDER — FAMOTIDINE 10 MG/ML
20 INJECTION INTRAVENOUS ONCE
Refills: 0 | Status: COMPLETED | OUTPATIENT
Start: 2021-08-27 | End: 2021-08-27

## 2021-08-27 RX ADMIN — Medication 1 GRAM(S): at 21:46

## 2021-08-27 RX ADMIN — Medication 30 MILLIGRAM(S): at 22:06

## 2021-08-27 RX ADMIN — MORPHINE SULFATE 2 MILLIGRAM(S): 50 CAPSULE, EXTENDED RELEASE ORAL at 22:06

## 2021-08-27 RX ADMIN — SODIUM CHLORIDE 1000 MILLILITER(S): 9 INJECTION INTRAMUSCULAR; INTRAVENOUS; SUBCUTANEOUS at 19:53

## 2021-08-27 RX ADMIN — SODIUM CHLORIDE 1000 MILLILITER(S): 9 INJECTION INTRAMUSCULAR; INTRAVENOUS; SUBCUTANEOUS at 22:06

## 2021-08-27 RX ADMIN — Medication 30 MILLIGRAM(S): at 19:53

## 2021-08-27 RX ADMIN — FAMOTIDINE 20 MILLIGRAM(S): 10 INJECTION INTRAVENOUS at 19:53

## 2021-08-27 RX ADMIN — Medication 25 MILLIGRAM(S): at 19:52

## 2021-08-27 RX ADMIN — MORPHINE SULFATE 2 MILLIGRAM(S): 50 CAPSULE, EXTENDED RELEASE ORAL at 19:53

## 2021-08-27 RX ADMIN — Medication 10 MILLIGRAM(S): at 19:53

## 2021-08-27 NOTE — ED PROVIDER NOTE - PHYSICAL EXAMINATION
VITAL SIGNS: I have reviewed nursing notes and confirm.  CONSTITUTIONAL: Well-developed; in no acute distress.   SKIN:  warm and dry, no acute rash.   HEAD:  normocephalic, atraumatic.  EYES: EOM intact; conjunctiva and sclera clear.  ENT: No nasal discharge; airway clear.   NECK: Supple; non tender.  CARD: S1, S2 normal; no murmurs, gallops, or rubs. Regular rate and rhythm.   RESP:  Clear to auscultation b/l, no wheezes, rales or rhonchi.  ABD: Normal bowel sounds; soft; non-distended; epigastric tenderness; no guarding/ rebound.  EXT: Normal ROM. No clubbing, cyanosis or edema. 2+ pulses to b/l ue/le.  NEURO: Alert, oriented, grossly unremarkable  PSYCH: Cooperative, mood and affect appropriate.

## 2021-08-27 NOTE — ED ADULT TRIAGE NOTE - CHIEF COMPLAINT QUOTE
Pt reports vomiting, weakness since start of her menstrual cycle 5 days ago. Pt reports unable to tolerate PO, difficulty walking due to weakness. Denies fevers, chills.

## 2021-08-27 NOTE — ED ADULT NURSE NOTE - NSICDXPASTMEDICALHX_GEN_ALL_CORE_FT
PAST MEDICAL HISTORY:  Chronic gastric ulcer Gastric ulcer without hemorrhage or perforation, chronic    Endometriosis

## 2021-08-27 NOTE — ED PROVIDER NOTE - NSICDXPASTSURGICALHX_GEN_ALL_CORE_FT
PAST SURGICAL HISTORY:  Cytomegalovirus infection not present No significant past surgical history

## 2021-08-27 NOTE — ED PROVIDER NOTE - CLINICAL SUMMARY MEDICAL DECISION MAKING FREE TEXT BOX
46 y/o F pt presents to the ED with nausea and vomiting associated with her menstrual cramps. Given fluids, Reglan, benadryl, Toradol IV, Pepcid IV, 2mg of morphine IV. Will check labs and reassess. 46 y/o F pt presents to the ED with nausea and vomiting associated with her menstrual cramps. Given fluids, Reglan, benadryl, Toradol IV, Pepcid IV, 2mg of morphine IV. Will check labs and reassess.  On re-evaluation, pt is AAOx3 and in NAD. Pt tolerating po in ED. Abdomen soft and nontender. Instructions given to follow up with OB/GYN in 2-3 days, return to ED for worsening condition. Pt expresses understanding.

## 2021-08-27 NOTE — ED ADULT NURSE NOTE - OBJECTIVE STATEMENT
Patient alert and oriented x 3 came c/o severe abdominal pain , nausea , vomiting , weakness and vaginal bleeding for 5 days , patient reported been going to other hosp 2 days ago for the same reason , medicating with no relief . Denies any fever nor chills ,. Seen and examined by Dr. Anderson , all labs sent .medicated for pain as ordered .

## 2021-08-27 NOTE — ED PROVIDER NOTE - OBJECTIVE STATEMENT
46 y/o F pt w/ PMHx of endometriosis and chronic gastric ulcer presents to the ED c/o abdominal pain, nausea and vomiting today. States that she has these symptoms during her menstrual episodes. Admits to having IUD placements in the past with no improvement of symptoms. Has not followed up the her OB/GYN given her symptoms in the last year. Tried to manage symptoms at home. Patient has past ED visits for similar complaints. Went to [facility] yesterday with no improvement of symptoms. In ED, patient is afebrile and in mild acute distress. Denies coughing and nasal congestion.

## 2021-08-27 NOTE — ED PROVIDER NOTE - PATIENT PORTAL LINK FT
You can access the FollowMyHealth Patient Portal offered by [facility] by registering at the following website: [email or web address]. By joining [employer]’s FollowMyHealth portal, you will also be able to view your health information using other applications (apps) compatible with our system.

## 2021-08-27 NOTE — ED ADULT TRIAGE NOTE - BMI (KG/M2)
Procedure: Colonoscopy with snare polypectomy and mucosal biopsy.    Indication: The patient is undergoing colonoscopy today for colorectal cancer screening.    Sedation used:  Monitored anesthesia    Estimated blood loss: Less than 10 mm.    Quality of prep: Good.    Procedure:    The patient was placed in the left lateral decubitus position and after adequate sedation had been given the Olympus colonoscope was then advanced to the cecum. Inspection was made of the mucosa in this region which appeared normal. We were able to intubate the very distal aspect of the terminal ileum which did have a normal appearance.    The scope then withdrawn back into the cecum and subsequently into the ascending colon where  4 polyps were seen.  Three of these lesions were 5-6 mm in size removed with cold snare technique.  Small 3 4 mm size polyp seen in this location was removed with cold biopsy forcep technique.  The transverse colon had a normal appearance.  Retroflexion was achieved in the right colon.    The scope then slowly withdrawn back into the descending colon where a 2 additional 5 mm size polyps were seen with cold snare technique.  In the sigmoid colon we did encounter a mm size polyp that was removed with cold biopsy forcep.  A few isolated diverticula were seen in the sigmoid colon as well.     The scope then withdrawn into the rectum where retroflexion was achieved and in this position minimal internal hemorrhoids were noted. The scope was straightened and careful inspection was made of the proximal rectal mucosa which otherwise appeared normal. The scope then slowly withdrawn the colon decompressed and the patient appeared to tolerate the procedure well.    Impression:   1. Normal-appearing terminal ileum.   2. A total of 7 colonic polyps seen on this exam and removed.    3. Mild diverticulosis of the sigmoid colon.    4. Minimal internal hemorrhoids.        Recommendation:    The patient will be advised about the  maintenance of a high-fiber diet. This should include a minimum of 20-30 g of dietary fiber per day. Additionally we will contact patient shortly to review the histology of the polyps that were taken from today's exam.      ID Type Source Tests Collected by Time   A : descending polyps Polyp Colon SURGICAL PATHOLOGY Mario KEENAN MD 3/4/2021 1141   B : ascending polyps Polyp Colon SURGICAL PATHOLOGY Mario KEENAN MD 3/4/2021 1145   C : sigmoid polyp Polyp Colon SURGICAL PATHOLOGY Mario KEENAN MD 3/4/2021 1205          26.5

## 2021-08-31 ENCOUNTER — INPATIENT (INPATIENT)
Facility: HOSPITAL | Age: 46
LOS: 0 days | Discharge: HOME | DRG: 392 | End: 2021-09-01
Attending: STUDENT IN AN ORGANIZED HEALTH CARE EDUCATION/TRAINING PROGRAM | Admitting: STUDENT IN AN ORGANIZED HEALTH CARE EDUCATION/TRAINING PROGRAM
Payer: SELF-PAY

## 2021-08-31 VITALS
SYSTOLIC BLOOD PRESSURE: 190 MMHG | HEIGHT: 71 IN | RESPIRATION RATE: 22 BRPM | OXYGEN SATURATION: 100 % | DIASTOLIC BLOOD PRESSURE: 102 MMHG | TEMPERATURE: 98 F | HEART RATE: 80 BPM

## 2021-08-31 DIAGNOSIS — R10.9 UNSPECIFIED ABDOMINAL PAIN: ICD-10-CM

## 2021-08-31 LAB
ANION GAP SERPL CALC-SCNC: 14 MMOL/L — SIGNIFICANT CHANGE UP (ref 5–17)
APPEARANCE UR: CLEAR — SIGNIFICANT CHANGE UP
BACTERIA # UR AUTO: SIGNIFICANT CHANGE UP /HPF
BILIRUB UR-MCNC: NEGATIVE — SIGNIFICANT CHANGE UP
BUN SERPL-MCNC: 9 MG/DL — SIGNIFICANT CHANGE UP (ref 7–23)
CALCIUM SERPL-MCNC: 9.3 MG/DL — SIGNIFICANT CHANGE UP (ref 8.4–10.5)
CHLORIDE SERPL-SCNC: 97 MMOL/L — SIGNIFICANT CHANGE UP (ref 96–108)
CO2 SERPL-SCNC: 24 MMOL/L — SIGNIFICANT CHANGE UP (ref 22–31)
COLOR SPEC: YELLOW — SIGNIFICANT CHANGE UP
CREAT SERPL-MCNC: 0.79 MG/DL — SIGNIFICANT CHANGE UP (ref 0.5–1.3)
DIFF PNL FLD: ABNORMAL
EPI CELLS # UR: SIGNIFICANT CHANGE UP /HPF (ref 0–5)
GLUCOSE SERPL-MCNC: 167 MG/DL — HIGH (ref 70–99)
GLUCOSE UR QL: NEGATIVE — SIGNIFICANT CHANGE UP
HCG SERPL-ACNC: <0 MIU/ML — SIGNIFICANT CHANGE UP
HCT VFR BLD CALC: 40.5 % — SIGNIFICANT CHANGE UP (ref 34.5–45)
HGB BLD-MCNC: 13.9 G/DL — SIGNIFICANT CHANGE UP (ref 11.5–15.5)
KETONES UR-MCNC: 15 MG/DL
LEUKOCYTE ESTERASE UR-ACNC: NEGATIVE — SIGNIFICANT CHANGE UP
LIDOCAIN IGE QN: 41 U/L — SIGNIFICANT CHANGE UP (ref 7–60)
MCHC RBC-ENTMCNC: 30.7 PG — SIGNIFICANT CHANGE UP (ref 27–34)
MCHC RBC-ENTMCNC: 34.3 GM/DL — SIGNIFICANT CHANGE UP (ref 32–36)
MCV RBC AUTO: 89.4 FL — SIGNIFICANT CHANGE UP (ref 80–100)
NITRITE UR-MCNC: NEGATIVE — SIGNIFICANT CHANGE UP
NRBC # BLD: 0 /100 WBCS — SIGNIFICANT CHANGE UP (ref 0–0)
PH UR: 7 — SIGNIFICANT CHANGE UP (ref 5–8)
PLATELET # BLD AUTO: 311 K/UL — SIGNIFICANT CHANGE UP (ref 150–400)
POTASSIUM SERPL-MCNC: 3.7 MMOL/L — SIGNIFICANT CHANGE UP (ref 3.5–5.3)
POTASSIUM SERPL-SCNC: 3.7 MMOL/L — SIGNIFICANT CHANGE UP (ref 3.5–5.3)
PROT UR-MCNC: NEGATIVE MG/DL — SIGNIFICANT CHANGE UP
RBC # BLD: 4.53 M/UL — SIGNIFICANT CHANGE UP (ref 3.8–5.2)
RBC # FLD: 11.8 % — SIGNIFICANT CHANGE UP (ref 10.3–14.5)
RBC CASTS # UR COMP ASSIST: SIGNIFICANT CHANGE UP /HPF
SARS-COV-2 RNA SPEC QL NAA+PROBE: SIGNIFICANT CHANGE UP
SODIUM SERPL-SCNC: 135 MMOL/L — SIGNIFICANT CHANGE UP (ref 135–145)
SP GR SPEC: 1.01 — SIGNIFICANT CHANGE UP (ref 1–1.03)
UROBILINOGEN FLD QL: 1 E.U./DL — SIGNIFICANT CHANGE UP
WBC # BLD: 6.14 K/UL — SIGNIFICANT CHANGE UP (ref 3.8–10.5)
WBC # FLD AUTO: 6.14 K/UL — SIGNIFICANT CHANGE UP (ref 3.8–10.5)
WBC UR QL: SIGNIFICANT CHANGE UP /HPF

## 2021-08-31 PROCEDURE — G1004: CPT

## 2021-08-31 PROCEDURE — 99053 MED SERV 10PM-8AM 24 HR FAC: CPT

## 2021-08-31 PROCEDURE — 99285 EMERGENCY DEPT VISIT HI MDM: CPT

## 2021-08-31 PROCEDURE — 99223 1ST HOSP IP/OBS HIGH 75: CPT | Mod: GC

## 2021-08-31 PROCEDURE — 74177 CT ABD & PELVIS W/CONTRAST: CPT | Mod: 26,MG

## 2021-08-31 RX ORDER — ONDANSETRON 8 MG/1
4 TABLET, FILM COATED ORAL ONCE
Refills: 0 | Status: COMPLETED | OUTPATIENT
Start: 2021-08-31 | End: 2021-08-31

## 2021-08-31 RX ORDER — POLYETHYLENE GLYCOL 3350 17 G/17G
17 POWDER, FOR SOLUTION ORAL DAILY
Refills: 0 | Status: DISCONTINUED | OUTPATIENT
Start: 2021-08-31 | End: 2021-09-01

## 2021-08-31 RX ORDER — METOCLOPRAMIDE HCL 10 MG
10 TABLET ORAL ONCE
Refills: 0 | Status: COMPLETED | OUTPATIENT
Start: 2021-08-31 | End: 2021-08-31

## 2021-08-31 RX ORDER — METRONIDAZOLE 500 MG
500 TABLET ORAL ONCE
Refills: 0 | Status: COMPLETED | OUTPATIENT
Start: 2021-08-31 | End: 2021-08-31

## 2021-08-31 RX ORDER — SODIUM CHLORIDE 9 MG/ML
1000 INJECTION INTRAMUSCULAR; INTRAVENOUS; SUBCUTANEOUS ONCE
Refills: 0 | Status: COMPLETED | OUTPATIENT
Start: 2021-08-31 | End: 2021-08-31

## 2021-08-31 RX ORDER — HYDRALAZINE HCL 50 MG
5 TABLET ORAL ONCE
Refills: 0 | Status: DISCONTINUED | OUTPATIENT
Start: 2021-08-31 | End: 2021-08-31

## 2021-08-31 RX ORDER — LIDOCAINE 4 G/100G
10 CREAM TOPICAL ONCE
Refills: 0 | Status: COMPLETED | OUTPATIENT
Start: 2021-08-31 | End: 2021-08-31

## 2021-08-31 RX ORDER — CEFTRIAXONE 500 MG/1
1000 INJECTION, POWDER, FOR SOLUTION INTRAMUSCULAR; INTRAVENOUS ONCE
Refills: 0 | Status: COMPLETED | OUTPATIENT
Start: 2021-08-31 | End: 2021-08-31

## 2021-08-31 RX ORDER — PANTOPRAZOLE SODIUM 20 MG/1
1 TABLET, DELAYED RELEASE ORAL
Qty: 0 | Refills: 0 | DISCHARGE

## 2021-08-31 RX ORDER — ACETAMINOPHEN 500 MG
325 TABLET ORAL EVERY 4 HOURS
Refills: 0 | Status: DISCONTINUED | OUTPATIENT
Start: 2021-08-31 | End: 2021-09-01

## 2021-08-31 RX ORDER — ACETAMINOPHEN 500 MG
1000 TABLET ORAL ONCE
Refills: 0 | Status: COMPLETED | OUTPATIENT
Start: 2021-08-31 | End: 2021-08-31

## 2021-08-31 RX ORDER — AMLODIPINE BESYLATE 2.5 MG/1
5 TABLET ORAL DAILY
Refills: 0 | Status: DISCONTINUED | OUTPATIENT
Start: 2021-08-31 | End: 2021-09-01

## 2021-08-31 RX ORDER — FAMOTIDINE 10 MG/ML
20 INJECTION INTRAVENOUS ONCE
Refills: 0 | Status: COMPLETED | OUTPATIENT
Start: 2021-08-31 | End: 2021-08-31

## 2021-08-31 RX ORDER — ONDANSETRON 8 MG/1
4 TABLET, FILM COATED ORAL EVERY 8 HOURS
Refills: 0 | Status: DISCONTINUED | OUTPATIENT
Start: 2021-08-31 | End: 2021-09-01

## 2021-08-31 RX ORDER — MORPHINE SULFATE 50 MG/1
2 CAPSULE, EXTENDED RELEASE ORAL ONCE
Refills: 0 | Status: DISCONTINUED | OUTPATIENT
Start: 2021-08-31 | End: 2021-08-31

## 2021-08-31 RX ORDER — SENNA PLUS 8.6 MG/1
2 TABLET ORAL AT BEDTIME
Refills: 0 | Status: DISCONTINUED | OUTPATIENT
Start: 2021-08-31 | End: 2021-09-01

## 2021-08-31 RX ORDER — METOCLOPRAMIDE HCL 10 MG
10 TABLET ORAL ONCE
Refills: 0 | Status: DISCONTINUED | OUTPATIENT
Start: 2021-08-31 | End: 2021-08-31

## 2021-08-31 RX ORDER — IOHEXOL 300 MG/ML
30 INJECTION, SOLUTION INTRAVENOUS ONCE
Refills: 0 | Status: COMPLETED | OUTPATIENT
Start: 2021-08-31 | End: 2021-08-31

## 2021-08-31 RX ADMIN — CEFTRIAXONE 100 MILLIGRAM(S): 500 INJECTION, POWDER, FOR SOLUTION INTRAMUSCULAR; INTRAVENOUS at 10:39

## 2021-08-31 RX ADMIN — MORPHINE SULFATE 2 MILLIGRAM(S): 50 CAPSULE, EXTENDED RELEASE ORAL at 12:59

## 2021-08-31 RX ADMIN — IOHEXOL 30 MILLILITER(S): 300 INJECTION, SOLUTION INTRAVENOUS at 07:21

## 2021-08-31 RX ADMIN — Medication 1000 MILLIGRAM(S): at 10:46

## 2021-08-31 RX ADMIN — MORPHINE SULFATE 2 MILLIGRAM(S): 50 CAPSULE, EXTENDED RELEASE ORAL at 12:47

## 2021-08-31 RX ADMIN — Medication 100 MILLIGRAM(S): at 11:06

## 2021-08-31 RX ADMIN — ONDANSETRON 4 MILLIGRAM(S): 8 TABLET, FILM COATED ORAL at 07:23

## 2021-08-31 RX ADMIN — LIDOCAINE 10 MILLILITER(S): 4 CREAM TOPICAL at 07:21

## 2021-08-31 RX ADMIN — Medication 400 MILLIGRAM(S): at 18:42

## 2021-08-31 RX ADMIN — Medication 104 MILLIGRAM(S): at 07:35

## 2021-08-31 RX ADMIN — MORPHINE SULFATE 2 MILLIGRAM(S): 50 CAPSULE, EXTENDED RELEASE ORAL at 11:04

## 2021-08-31 RX ADMIN — Medication 400 MILLIGRAM(S): at 08:34

## 2021-08-31 RX ADMIN — Medication 30 MILLILITER(S): at 07:22

## 2021-08-31 RX ADMIN — MORPHINE SULFATE 2 MILLIGRAM(S): 50 CAPSULE, EXTENDED RELEASE ORAL at 10:46

## 2021-08-31 RX ADMIN — FAMOTIDINE 20 MILLIGRAM(S): 10 INJECTION INTRAVENOUS at 07:22

## 2021-08-31 RX ADMIN — ONDANSETRON 4 MILLIGRAM(S): 8 TABLET, FILM COATED ORAL at 13:25

## 2021-08-31 RX ADMIN — Medication 1000 MILLIGRAM(S): at 19:39

## 2021-08-31 RX ADMIN — SODIUM CHLORIDE 1000 MILLILITER(S): 9 INJECTION INTRAMUSCULAR; INTRAVENOUS; SUBCUTANEOUS at 07:22

## 2021-08-31 NOTE — H&P ADULT - HISTORY OF PRESENT ILLNESS
Pt is a 46 y/o F with PMH of gastric ulcer, endometriosis with dysmenorrhea, IUD placement presented with 6 days history of pain abdomen, nausea, vomiting which got exacerbated today after having breakfast. Pt states that her pain was 10/10 in intensity which radiated to her entire stomach (more towards left side), feels like 'fire ball' and was relieved only after receiving Morphine in the ER.  She states that she has not been able to keep anything down and has not eaten anything due to the nausea and feels extremely weak . She states that she has been treated for H. Pylori in the past. She mentions that she has a h/o dysmenorrhea 2/2 endometriosis and gets severe abdominal pain, nausea during her menstrual periods. In fact she has been multiple times in the past for the management of dysmenorrhea Her LMP is 08/23. She also had an episode of loose stool today. She denies headache, vision disturbance, chest pain, cough, SOB, fever, black stool, constipation, dysuria or numbness.  Home medication: Pantoprazole 40 mg  In ED:  Vitals: /111, HR 68, T 98.8, RR 20  labs: Hb 12.7, Hct 38.4, WBC 9.04, Plt 38.4, BUN 9, Cr 0.79, Lipase 29  Imaging: CT abdomen/pelvis: Mild diverticulitis/colitis distal left colon/proximal sigmoid.  EKG: wnl  Meds: acetaminophen, ondansetron, simethicone suspension, Ceftriaxone, famotidine, metoclopramide, morphine, NaCl bolus   Patient is admitted for management of pain abdomen and nausea.

## 2021-08-31 NOTE — H&P ADULT - PROBLEM SELECTOR PLAN 3
Pt has a h/o gastric ulcers and is on home pantoprazole 40 mg    TO DO:  -f/u H . Pylori antigen test  -She has been treated in the past for H. Pylori Pt had /111 in the ED. Pt has no h/o hypertension.     TO DO:  - Monitor BP and if still high and pain as an etiology is ruled out then can start amlodipine 5 mg   - f/u TSH

## 2021-08-31 NOTE — H&P ADULT - NSHPPHYSICALEXAM_GEN_ALL_CORE
PHYSICAL EXAM: She was examined at bedside. She was in distress because of the abdominal pain and felt very weak as she is barely eating anything since last 6 days  Constitutional: WDWN  HEENT: NC/AT, sclera no icteric, MMM  Neck: supple, no JVD  Respiratory: CTA b/l, no wheezes, R/R  Cardiovascular: S1 S2 heard, no murmurs  Gastrointestinal: soft, non distended but tender, BS + x4  Extremities: pulses present b/l, no cyanosis, edema, clubbing  Neurological: AAN x 3, no focal neurological deficit, no numbness

## 2021-08-31 NOTE — H&P ADULT - PROBLEM SELECTOR PLAN 1
Patient presented with 6 days h/o abdominal pian and nausea. D/D Peptic ulcer disease vs premenstrual dysmorphic disorder vs endometriosis   - H. pylori antigen ordered  - s/p acetaminophen, ondansetron, simethicone suspension, Ceftriaxone, famotidine, metoclopramide, morphine, NaCl bolus in ED  - She has been taking Pantoprazole 40 mg at home   - Ct scan in the ED shows Mild diverticulitis/colitis distal left colon/proximal sigmoid.    TO DO:  - f/u H. pylori antigen test  - c/w bowel regimen  - c/w tylenol prn Patient presented with 6 days h/o abdominal pian and nausea. D/D Peptic ulcer disease vs premenstrual dysmorphic disorder vs endometriosis   - s/p acetaminophen, ondansetron, simethicone suspension, Ceftriaxone, famotidine, metoclopramide, morphine, NaCl bolus in ED  - She has been taking Pantoprazole 40 mg at home   - Ct scan in the ED shows Mild diverticulitis/colitis distal left colon/proximal sigmoid.    TO DO:  - f/u H. pylori antigen test, GI PCR  - c/w bowel regimen  - c/w tylenol prn  - avoid opiates for pain  - f/u outpatient GI

## 2021-08-31 NOTE — H&P ADULT - PROBLEM SELECTOR PLAN 4
Pt has been admitted multiple times in the past with dysmenorrhea and nausea. Her LMP 08/23    TO DO:  - f/u B HCG  - c/w tylenol prn Pt has a h/o gastric ulcers and is on home pantoprazole 40 mg    TO DO:  -f/u H . Pylori antigen test  -She has been treated in the past for H. Pylori

## 2021-08-31 NOTE — H&P ADULT - PROBLEM SELECTOR PLAN 2
Patient presented with 6 days of nausea and vomiting and has not been able to eat anything since then. D/D Peptic ulcer disease vs premenstrual dysmorphic disorder (pt presented multiple time in the past with dysmenorrhea   vs endometriosis (pt has a h/o dysmenorrhea with vomiting)   - Pt is on clear liquid diet, advance prn    TO DO:   -c/w IV zofran q8hrly PRN  -c/w simethicone  q4hrly PRN Patient presented with 6 days of nausea and vomiting and has not been able to eat anything since then. D/D Peptic ulcer disease vs premenstrual dysmorphic disorder (pt presented multiple time in the past with dysmenorrhea   vs endometriosis (pt has a h/o dysmenorrhea with vomiting)   - Pt is on clear liquid diet, advance prn    TO DO:   -c/w IV zofran q8hrly PRN can be used in rotation with reglan   -c/w simethicone  q4hrly PRN

## 2021-08-31 NOTE — H&P ADULT - ASSESSMENT
Pt is a 44 y/o F with PMH of gastric ulcer, endometriosis with dysmenorrhea, IUD placement presented with 6 days history of pain abdomen, nausea, vomiting which got exacerbated today after having breakfast. She is admitted for management of pain abdomen and nausea.

## 2021-08-31 NOTE — ED PROVIDER NOTE - PROGRESS NOTE DETAILS
pt hasn't been vomiting, resting comfortably, asked to start drinking contrast and she states she "already threw it up" and doesn't want to, asking for pain medication.  Pt offered IV tylenol which she initially refused asking why she isn't receiving morphine for pain which is what helps.  It was explained to pt that currently there is no indication for narcotic pain medication, she asked to speak to attending and after conversation with ED attending she is willing to take IV tylenol, will get CT with IV contrast, f/u results. CT shows evidence of mild diverticulitis, pt hasn't vomited in the past 2 hrs although reports persistent nausea.  Pt given dose of ceftriaxone/flagyl, 2mg morphine for pain, will reassess and PO trial pt not tolerating PO, will admit for continued IV hydration, abx, antiemetic

## 2021-08-31 NOTE — ED PROVIDER NOTE - CLINICAL SUMMARY MEDICAL DECISION MAKING FREE TEXT BOX
1 44 y/o f hx chronic abd pain, gastric ulcer presents c/o LUQ pain, n/v today; pt afebrile in ED, BP noted to be elevated, pt vomiting during eval, will plan to repeat.  Pt given zofran prior to evaluation, noted to be resting comfortably between interactions, moaning and complaining of pain during evaluation.  Pt vomited after zofran so will give reglan.  +Mild LUQ tenderness on exam, has hx gastric ulcer and gastric etiology considered.  Will try GI cocktail when pt tolerating PO, no indication for narcotic pain medication at this time, pt see multiple times over the past 2 months for this, has had negative w/u, reports being seen at [facility] as well.  Plan for CT a/p as pt c/o persistent pain, r/o perforated ulcer, other acute surgical pathology.  Will f/u labs, imaging and reassess. 2

## 2021-08-31 NOTE — ED PROVIDER NOTE - OBJECTIVE STATEMENT
46 y/o hx chronic abd pain, gastric ulcer presents c/o LUQ abd pain, n/v today.  Pt states she was seen 3 days ago for this, given carafate and zofran prescriptions which she states her pharmacy didn't have and had to order them so she was unable to fill them.  Pt stating she ate breakfast today and began having sharp left upper abd pain, vomited.  Pt stating pain is severe, asking for pain medication.  Denies fever, chills, diarrhea, dysuria, CP, SOB, all other ROS negative.

## 2021-08-31 NOTE — H&P ADULT - ATTENDING COMMENTS
Pt is a 46 y/o F with PMH of gastric ulcer, endometriosis with dysmenorrhea, IUD placement presented with 6 days history of pain abdomen, nausea, vomiting which got exacerbated today after having breakfast. Admitted for inability to tolerate PO intake.     #inability to tolerate PO   With frequent ED visits with similar complaints, work up has been negative   Ct scan in the ED shows Mild diverticulitis/colitis distal left colon/proximal sigmoid  Endorses prior hx of H pylori   In setting of PPI use, reduces sensitivity of test, however will obtain stool test   Will ADAT   CW tylenol for pain, will avoid narcotics at this time   Obtain GI PCR  CW antiemetics- if unable to tolerate PO, start maintance IVF    #Elevated BP w/o dx of HTN   start norvasc 5, uptitrate PRN   Ensure pain is controlled and pt is not vomiting

## 2021-08-31 NOTE — H&P ADULT - NSHPLABSRESULTS_GEN_ALL_CORE
.  LABS:                         13.9   6.14  )-----------( 311      ( 31 Aug 2021 07:01 )             40.5     08-31    135  |  97  |  9   ----------------------------<  167<H>  3.7   |  24  |  0.79    Ca    9.3      31 Aug 2021 07:01                    RADIOLOGY, EKG & ADDITIONAL TESTS: CT abdomen/pelvis: Mild diverticulitis/colitis distal left colon/proximal sigmoid.

## 2021-08-31 NOTE — H&P ADULT - PROBLEM SELECTOR PLAN 5
F: none  E: replete prn  N: clear liquid diet  A: none  G ppx: IV zofran q8hrly PRN F: none  E: replete prn  N: clear liquid diet  A: none  G ppx: simethicone  q4hrly PRN Pt has been admitted multiple times in the past with dysmenorrhea and nausea. Her LMP 08/23    TO DO:  - f/u B HCG  - c/w tylenol prn  - f/u outpatient gyn

## 2021-08-31 NOTE — ED PEDIATRIC NURSE REASSESSMENT NOTE - NS ED NURSE REASSESS COMMENT FT2
Pain returned in abdomen, 7/10. 2 mg morphine given. Pt moved to holding, report given to Devin BENAVIDEZ. Pt alert and oriented to person, time and place.

## 2021-08-31 NOTE — ED ADULT TRIAGE NOTE - ARRIVAL INFO ADDITIONAL COMMENTS
pt c/o severe abd pain with n/v.  seen here 3 days ago and dx with dysmenorrhea, had negative workup and was discharged.

## 2021-08-31 NOTE — ED PROVIDER NOTE - ATTENDING CONTRIBUTION TO CARE
44 yo f w hx of peptic ulcers, ?endometriosis presents to ED with concern for LUQ abd discomfort following PO intake of toast/tea with associated n/v.  She notes pain to be sharp and stabbing, and was seen in ED several days ago for similar symptoms, treated for pain, no imaging completed at the time.  She notes multiple recent ED visits for similar symptoms.  Requesting narcotic pain medication and benadryl to treat symptoms.  On exam, patient is non toxic in appearance.  HRRR, lungs clear.  Abd soft, ND w mild TTP over LUQ.  No peritoneal signs.  Will obtain labs, CT abd/pel, give GI cocktail and dispo accordingly.

## 2021-09-01 ENCOUNTER — TRANSCRIPTION ENCOUNTER (OUTPATIENT)
Age: 46
End: 2021-09-01

## 2021-09-01 VITALS
DIASTOLIC BLOOD PRESSURE: 66 MMHG | HEART RATE: 76 BPM | RESPIRATION RATE: 18 BRPM | SYSTOLIC BLOOD PRESSURE: 100 MMHG | OXYGEN SATURATION: 98 % | TEMPERATURE: 98 F

## 2021-09-01 LAB
ANION GAP SERPL CALC-SCNC: 9 MMOL/L — SIGNIFICANT CHANGE UP (ref 5–17)
BASOPHILS # BLD AUTO: 0.03 K/UL — SIGNIFICANT CHANGE UP (ref 0–0.2)
BASOPHILS NFR BLD AUTO: 0.5 % — SIGNIFICANT CHANGE UP (ref 0–2)
BUN SERPL-MCNC: 8 MG/DL — SIGNIFICANT CHANGE UP (ref 7–23)
CALCIUM SERPL-MCNC: 9.2 MG/DL — SIGNIFICANT CHANGE UP (ref 8.4–10.5)
CHLORIDE SERPL-SCNC: 102 MMOL/L — SIGNIFICANT CHANGE UP (ref 96–108)
CO2 SERPL-SCNC: 28 MMOL/L — SIGNIFICANT CHANGE UP (ref 22–31)
COVID-19 SPIKE DOMAIN AB INTERP: NEGATIVE — SIGNIFICANT CHANGE UP
COVID-19 SPIKE DOMAIN ANTIBODY RESULT: 0.4 U/ML — SIGNIFICANT CHANGE UP
CREAT SERPL-MCNC: 0.8 MG/DL — SIGNIFICANT CHANGE UP (ref 0.5–1.3)
EOSINOPHIL # BLD AUTO: 0.01 K/UL — SIGNIFICANT CHANGE UP (ref 0–0.5)
EOSINOPHIL NFR BLD AUTO: 0.2 % — SIGNIFICANT CHANGE UP (ref 0–6)
GLUCOSE SERPL-MCNC: 132 MG/DL — HIGH (ref 70–99)
HCT VFR BLD CALC: 36.7 % — SIGNIFICANT CHANGE UP (ref 34.5–45)
HGB BLD-MCNC: 11.9 G/DL — SIGNIFICANT CHANGE UP (ref 11.5–15.5)
IMM GRANULOCYTES NFR BLD AUTO: 0.2 % — SIGNIFICANT CHANGE UP (ref 0–1.5)
LYMPHOCYTES # BLD AUTO: 2.47 K/UL — SIGNIFICANT CHANGE UP (ref 1–3.3)
LYMPHOCYTES # BLD AUTO: 38.8 % — SIGNIFICANT CHANGE UP (ref 13–44)
MCHC RBC-ENTMCNC: 29.8 PG — SIGNIFICANT CHANGE UP (ref 27–34)
MCHC RBC-ENTMCNC: 32.4 GM/DL — SIGNIFICANT CHANGE UP (ref 32–36)
MCV RBC AUTO: 92 FL — SIGNIFICANT CHANGE UP (ref 80–100)
MONOCYTES # BLD AUTO: 0.86 K/UL — SIGNIFICANT CHANGE UP (ref 0–0.9)
MONOCYTES NFR BLD AUTO: 13.5 % — SIGNIFICANT CHANGE UP (ref 2–14)
NEUTROPHILS # BLD AUTO: 2.99 K/UL — SIGNIFICANT CHANGE UP (ref 1.8–7.4)
NEUTROPHILS NFR BLD AUTO: 46.8 % — SIGNIFICANT CHANGE UP (ref 43–77)
NRBC # BLD: 0 /100 WBCS — SIGNIFICANT CHANGE UP (ref 0–0)
PLATELET # BLD AUTO: 278 K/UL — SIGNIFICANT CHANGE UP (ref 150–400)
POTASSIUM SERPL-MCNC: 3.2 MMOL/L — LOW (ref 3.5–5.3)
POTASSIUM SERPL-SCNC: 3.2 MMOL/L — LOW (ref 3.5–5.3)
RBC # BLD: 3.99 M/UL — SIGNIFICANT CHANGE UP (ref 3.8–5.2)
RBC # FLD: 11.9 % — SIGNIFICANT CHANGE UP (ref 10.3–14.5)
SARS-COV-2 IGG+IGM SERPL QL IA: 0.4 U/ML — SIGNIFICANT CHANGE UP
SARS-COV-2 IGG+IGM SERPL QL IA: NEGATIVE — SIGNIFICANT CHANGE UP
SODIUM SERPL-SCNC: 139 MMOL/L — SIGNIFICANT CHANGE UP (ref 135–145)
WBC # BLD: 6.37 K/UL — SIGNIFICANT CHANGE UP (ref 3.8–10.5)
WBC # FLD AUTO: 6.37 K/UL — SIGNIFICANT CHANGE UP (ref 3.8–10.5)

## 2021-09-01 PROCEDURE — U0005: CPT

## 2021-09-01 PROCEDURE — 96376 TX/PRO/DX INJ SAME DRUG ADON: CPT

## 2021-09-01 PROCEDURE — G1004: CPT

## 2021-09-01 PROCEDURE — 81001 URINALYSIS AUTO W/SCOPE: CPT

## 2021-09-01 PROCEDURE — 86769 SARS-COV-2 COVID-19 ANTIBODY: CPT

## 2021-09-01 PROCEDURE — 87086 URINE CULTURE/COLONY COUNT: CPT

## 2021-09-01 PROCEDURE — 84702 CHORIONIC GONADOTROPIN TEST: CPT

## 2021-09-01 PROCEDURE — 99285 EMERGENCY DEPT VISIT HI MDM: CPT

## 2021-09-01 PROCEDURE — 99238 HOSP IP/OBS DSCHRG MGMT 30/<: CPT

## 2021-09-01 PROCEDURE — 74177 CT ABD & PELVIS W/CONTRAST: CPT | Mod: MG

## 2021-09-01 PROCEDURE — U0003: CPT

## 2021-09-01 PROCEDURE — 80048 BASIC METABOLIC PNL TOTAL CA: CPT

## 2021-09-01 PROCEDURE — 83690 ASSAY OF LIPASE: CPT

## 2021-09-01 PROCEDURE — 96375 TX/PRO/DX INJ NEW DRUG ADDON: CPT

## 2021-09-01 PROCEDURE — 96365 THER/PROPH/DIAG IV INF INIT: CPT

## 2021-09-01 PROCEDURE — 85025 COMPLETE CBC W/AUTO DIFF WBC: CPT

## 2021-09-01 PROCEDURE — 36415 COLL VENOUS BLD VENIPUNCTURE: CPT

## 2021-09-01 PROCEDURE — 96366 THER/PROPH/DIAG IV INF ADDON: CPT

## 2021-09-01 PROCEDURE — 85027 COMPLETE CBC AUTOMATED: CPT

## 2021-09-01 RX ORDER — INFLUENZA VIRUS VACCINE 15; 15; 15; 15 UG/.5ML; UG/.5ML; UG/.5ML; UG/.5ML
0.5 SUSPENSION INTRAMUSCULAR ONCE
Refills: 0 | Status: DISCONTINUED | OUTPATIENT
Start: 2021-09-01 | End: 2021-09-01

## 2021-09-01 RX ORDER — PANTOPRAZOLE SODIUM 20 MG/1
40 TABLET, DELAYED RELEASE ORAL
Refills: 0 | Status: DISCONTINUED | OUTPATIENT
Start: 2021-09-01 | End: 2021-09-01

## 2021-09-01 RX ORDER — POTASSIUM CHLORIDE 20 MEQ
40 PACKET (EA) ORAL ONCE
Refills: 0 | Status: COMPLETED | OUTPATIENT
Start: 2021-09-01 | End: 2021-09-01

## 2021-09-01 RX ADMIN — PANTOPRAZOLE SODIUM 40 MILLIGRAM(S): 20 TABLET, DELAYED RELEASE ORAL at 09:16

## 2021-09-01 RX ADMIN — POLYETHYLENE GLYCOL 3350 17 GRAM(S): 17 POWDER, FOR SOLUTION ORAL at 11:05

## 2021-09-01 RX ADMIN — Medication 40 MILLIEQUIVALENT(S): at 09:16

## 2021-09-01 NOTE — DISCHARGE NOTE PROVIDER - NSDCCPCAREPLAN_GEN_ALL_CORE_FT
PRINCIPAL DISCHARGE DIAGNOSIS  Diagnosis: Gastritis  Assessment and Plan of Treatment: Gastritis is an inflammation, irritation, or erosion of the lining of the stomach. It can occur suddenly (acute) or gradually (chronic). Gastritis can be caused by irritation due to excessive alcohol use, chronic vomiting, stress, or the use of certain medications such as aspirin or other anti-inflammatory drugs. It may also be caused by any of the following: Helicobacter pylori (H. pylori): A bacteria that lives in the mucous lining of the stomach, Bile reflux: A backflow of bile into the stomach from the bile tract (that connects to the liver and gallbladder), Infections caused by bacteria and viruses.  If gastritis is left untreated, it can lead to a severe loss of blood and may increase the risk of developing stomach cancer. Symptoms of gastritis vary among individuals, and in many people there are no symptoms. However, the most common symptoms include: Nausea or recurrent upset stomach, Abdominal bloating, Abdominal pain, Vomiting, Indigestion, Burning or gnawing feeling in the stomach between meals or at night, Hiccups, Loss of appetite, Vomiting blood or coffee ground-like material, and Black, tarry stools.   Please take Protonix as instructed.      SECONDARY DISCHARGE DIAGNOSES  Diagnosis: Dysmenorrhea  Assessment and Plan of Treatment: Dysmenorrhea is painful menstrual cramps at or around the time of your monthly period. It may be accompanied by low back pain, bloating, headache, diarrhea, etc. Your body normally produces chemicals each month to help your uterus contract. When too many of these chemicals are made, your uterus contracts too much and causes pain. Dysmenorrhea may also be caused by any of the following: Abnormal structure of your uterus or vagina, narrow cervix, growth in or on your uterus or ovaries, medical conditions, such as pelvic inflammatory disease, endometriosis, or uterine fibroids, copper intrauterine device (IUD)  Your healthcare provider can usually diagnose dysmenorrhea by your signs and symptoms. Tell him or her when your symptoms started and if you have pain between your monthly periods. He or she may ask if anything relieves your pain, such as heat or medicine. Tell your provider if you are sexually active or have ever been pregnant. You may need any of the following: a blood test will check for pregnancy, pelvic exam, cervical culture to check for infection, ultrasound of reproductive organs  Dysmenorrhea can be controlled with lifestyle changes and medicines (NSAIDs, birth control pills, IUD that doesn't contain copper). Pain usually improves with age and pregnancy.  Please follow up with your gynecologist.

## 2021-09-01 NOTE — DISCHARGE NOTE PROVIDER - PROVIDER TOKENS
PROVIDER:[TOKEN:[[id number]],FOLLOWUP:[1 week]] PROVIDER:[TOKEN:[[id number]],FOLLOWUP:[1 week]],PROVIDER:[TOKEN:[[id number]],FOLLOWUP:[Routine]]

## 2021-09-01 NOTE — DISCHARGE NOTE PROVIDER - CARE PROVIDERS DIRECT ADDRESSES
[email or web address]

## 2021-09-01 NOTE — DISCHARGE NOTE NURSING/CASE MANAGEMENT/SOCIAL WORK - PATIENT PORTAL LINK FT
You can access the FollowMyHealth Patient Portal offered by [facility] by registering at the following website: [email or web address]. By joining [employer]’s FollowMyHealth portal, you will also be able to view your health information using other applications (apps) compatible with our system.

## 2021-09-01 NOTE — DISCHARGE NOTE PROVIDER - CARE PROVIDER_API CALL
Fidel Cruz  Gastroenterology  [address] [place] [address]  Phone: [phone]  Fax: [phone]  Follow Up Time: 1 week   Fidel Cruz  Gastroenterology  [address] [address]  Phone: [phone]  Fax: [phone]  Follow Up Time: 1 week    Estrellita Cabezas)  Obstetrics and Gynecology  [address], [facility] [place] [address]  Phone: [phone]  Fax: [phone]  Follow Up Time: Routine

## 2021-09-01 NOTE — DISCHARGE NOTE PROVIDER - NSDCFUADDINST_GEN_ALL_CORE_FT
Diet high in fiber (beans, whole grains, nuts, potato, berries, pear, avocado, apples, bananas, carrots), continue with diet low in acid.

## 2021-09-01 NOTE — DISCHARGE NOTE PROVIDER - HOSPITAL COURSE
#Discharge: do not delete    Patient is 46 yo F with past medical history of gastric ulcers, dysmenorrhea 2/2 undiagnosed endometriosis, 11 year history of GI pain associated with her menstrual cycle, and 1st trimester miscarriage in 2020 presented with 6-day h/o abdominal pain, nausea, vomiting that was exacerbated yesterday morning after eating breakfast.     She was found to have     Problem List/Main Diagnoses (system-based):     #abdominal pain R/O gastritis, GERD, PUD, PMDD, endometriosis      #nausea, vomiting  -IV zofran Q8     #dysmenorrhea 2/2 undiagnosed endometriosis  -denies sx of PID, vaginitis  -f/u outpatient gynecologist for trial of OCPs     #anxiety R/O panic attacks  -precedes her periods due to anticipation of pain  -refer to outpatient psychiatry    Inpatient treatment course:     Patient was discharged to home.    New medications:   Changes to old medications:   Medications that were stopped:    Items to Follow up as outpatient: Endometriosis, epigastric pain, anxiety    Physical exam at time of discharge:   Constitutional: WDWN resting comfortably in bed; NAD  Head: NC/AT  Eyes: PERRL, EOMI, anicteric sclera  ENT: no nasal discharge; uvula midline  Neck: supple; no JVD or thyromegaly  Respiratory: CTA B/L; no W/R/R, no use of accessory muscles  Cardiac: +S1/S2; RRR; no M/R/G  Gastrointestinal: abdomen soft, NT/ND; no rebound or guarding; +BSx4, horizontal scar at midline s/p 2 C/S  Back: spine midline, no CVAT B/L  Extremities: no clubbing or cyanosis; no peripheral edema  Musculoskeletal: no joint swelling, tenderness or erythema  Vascular: 2+ radial pulses B/L  Dermatologic: skin warm, dry and intact; no rashes  Neurologic: A&Ox4; CNII-XII grossly intact; no focal deficits  Psychiatric: affect and characteristics of appearance, verbalizations, behaviors are appropriate #Discharge: do not delete    Patient is 44 yo F with past medical history of gastric ulcers, dysmenorrhea 2/2 undiagnosed endometriosis, 11 year history of GI pain associated with her menstrual cycle, and 1st trimester miscarriage in  presented with 6-day h/o abdominal pain, nausea, vomiting that was exacerbated yesterday morning after eating breakfast.     She was found to have ***    Problem List/Main Diagnoses (system-based):     #abdominal pain R/O gastritis, GERD, PUD, PMDD, endometriosis: LUQ pain rated at 20/10 in the ED, relieved with morphine. Given IV tylenol, IV pepcid, PO maalox in the ED. Pain rated at 0/10 this morning. Given pantoprazole and miralax on the floor.     #nausea, vomiting  -has not vomited since last night   -IV zofran, IVPB reglan in the ED    #dysmenorrhea 2/2 undiagnosed endometriosis  -denies sx of PID, vaginitis; remote h/o CT (, treated); ; miscarriage in 3/2020 at ~7 weeks  -f/u outpatient gynecologist for evaluation of endometriosis, trial of combined OCPs     #anxiety R/O panic attacks  -precedes her periods due to anticipation of pain  -refer to outpatient psychiatry    #diverticulitis  -not symptomatic, educated pt on importance of increasing fiber intake  -s/p flagyl, ceftriaxone in the ED    Inpatient treatment course:     Patient was discharged to home.    New medications:   Changes to old medications:   Medications that were stopped:     Items to Follow up as outpatient: Endometriosis, epigastric pain, anxiety    Physical exam at time of discharge:   Constitutional: WDWN resting comfortably in bed; NAD  Head: NC/AT  Eyes: PERRL, EOMI, anicteric sclera  ENT: no nasal discharge; uvula midline  Neck: supple; no JVD or thyromegaly  Respiratory: CTA B/L; no W/R/R, no use of accessory muscles  Cardiac: +S1/S2; RRR; no M/R/G  Gastrointestinal: abdomen soft, NT/ND; no rebound or guarding; +BSx4, horizontal scar at midline s/p 2 C/S  Back: spine midline, no CVAT B/L  Extremities: no clubbing or cyanosis; no peripheral edema  Musculoskeletal: no joint swelling, tenderness or erythema  Vascular: 2+ radial pulses B/L  Dermatologic: skin warm, dry and intact; no rashes  Neurologic: A&Ox4; CNII-XII grossly intact; no focal deficits  Psychiatric: affect and characteristics of appearance, verbalizations, behaviors are appropriate #Discharge: do not delete    Patient is 44 yo F with past medical history of gastric ulcers, dysmenorrhea 2/2 undiagnosed endometriosis, 11 year history of GI pain associated with her menstrual cycle, and 1st trimester miscarriage in  presented with 6-day h/o abdominal pain, nausea, vomiting that was exacerbated yesterday morning after eating breakfast.     She was found to have ***    Problem List/Main Diagnoses (system-based):     #abdominal pain, nausea, vomiting R/O gastritis, GERD, PUD, PMDD, endometriosis: LUQ pain rated at 20/10 in the ED, relieved with morphine. Vomited multiple times and given IV tylenol, IV pepcid, IV zofran, IVPB reglan, PO maalox in the ED. She has been on a bowel regimen and clear liquid diet. VS stable and PE unremarkable on the floor. Small hiatal hernia, mild diverticulitis/colitis in distal left colon/proximal sigmoid found on CT abdomen. s/p flagyl and ceftriaxone. No sx of diverticulitis. Educated pt on importance of fiber intake.    Pain rated at 0/10 this morning. Has not vomited since last night. Given pantoprazole and miralax on the floor. Advanced to regular diet.    Dx pending H.pylori stool antigen, GI PCR results.    F/u with outpatient GI.     #dysmenorrhea 2/2 undiagnosed endometriosis  -denies sx of PID, vaginitis; remote h/o CT (, treated); ; 2 C/S, miscarriage in 3/2020 at ~7 weeks, negative HCG  -f/u outpatient gynecologist for evaluation of endometriosis, trial of combined OCPs, avoid NSAIDs due to h/o gastric ulcers    #anxiety R/O panic attacks  -precedes her periods due to anticipation of pain, c/o palpitations, impending sense of doom  -refer to outpatient psychiatry    Inpatient treatment course: Pantoprazole, Miralax    Patient was discharged to home.    New medications:   Changes to old medications: None  Medications that were stopped: None    Items to Follow up as outpatient: Endometriosis, epigastric pain, anxiety    Physical exam at time of discharge:   Constitutional: WDWN resting comfortably in bed; NAD  Head: NC/AT  Eyes: PERRL, EOMI, anicteric sclera  ENT: no nasal discharge; uvula midline  Neck: supple; no JVD or thyromegaly  Respiratory: CTA B/L; no W/R/R, no use of accessory muscles  Cardiac: +S1/S2; RRR; no M/R/G  Gastrointestinal: abdomen soft, NT/ND; no rebound or guarding; +BSx4, horizontal scar at midline s/p 2 C/S  Back: spine midline, no CVAT B/L  Extremities: no clubbing or cyanosis; no peripheral edema  Musculoskeletal: no joint swelling, tenderness or erythema  Vascular: 2+ radial pulses B/L  Dermatologic: skin warm, dry and intact; no rashes  Neurologic: A&Ox4; CNII-XII grossly intact; no focal deficits  Psychiatric: affect and characteristics of appearance, verbalizations, behaviors are appropriate #Discharge: do not delete    Patient is 44 yo F with past medical history of gastric ulcers, dysmenorrhea 2/2 undiagnosed endometriosis, 11 year history of GI pain associated with her menstrual cycle, and 1st trimester miscarriage in  presented with 6-day h/o abdominal pain, nausea, vomiting that was exacerbated yesterday morning after eating breakfast (rice, chicken). She was found to have acid reflux.    Problem List/Main Diagnoses (system-based):     #abdominal pain, nausea, vomiting R/O gastritis, GERD, PUD, PMDD, endometriosis: LUQ pain rated at 20/10 in the ED, relieved with morphine. Vomited multiple times and given IV tylenol, IV pepcid, IV zofran, IVPB reglan, PO maalox in the ED. She has been on a bowel regimen and clear liquid diet. VS stable and PE unremarkable on the floor. Small hiatal hernia, mild diverticulitis/colitis in distal left colon/proximal sigmoid found on CT abdomen. s/p flagyl and ceftriaxone. No sx of diverticulitis. Educated pt on importance of fiber intake. Pain rated at 0/10 this morning. Has not vomited since last night. Given pantoprazole and miralax on the floor. Advanced to regular diet, which pt tolerated.    Dx pending H.pylori stool antigen, GI PCR results. F/u with outpatient GI.     #dysmenorrhea 2/2 undiagnosed endometriosis  -denies sx of PID, vaginitis; remote h/o CT (, treated); ; 2 C/S, miscarriage in 3/2020 at ~7 weeks, negative HCG, TVUS WNL (2021)  -f/u outpatient gynecologist for evaluation of endometriosis, trial of combined OCPs, avoid NSAIDs due to h/o gastric ulcers    #anxiety R/O panic attacks  -precedes her periods due to anticipation of pain, c/o palpitations, impending sense of doom  -refer to outpatient psychiatry    Inpatient treatment course: Pantoprazole, Miralax  Patient was discharged to: home.  New medications: None  Changes to old medications: None  Medications that were stopped: None    Items to Follow up as outpatient: Endometriosis, epigastric pain, anxiety    Physical exam at time of discharge:   Constitutional: WDWN resting comfortably in bed; NAD  Head: NC/AT  Eyes: PERRL, EOMI, anicteric sclera  ENT: no nasal discharge; uvula midline  Neck: supple; no JVD or thyromegaly  Respiratory: CTA B/L; no W/R/R, no use of accessory muscles  Cardiac: +S1/S2; RRR; no M/R/G  Gastrointestinal: abdomen soft, NT/ND; no rebound or guarding; +BSx4, horizontal scar at midline s/p 2 C/S  Back: spine midline, no CVAT B/L  Extremities: no clubbing or cyanosis; no peripheral edema  Musculoskeletal: no joint swelling, tenderness or erythema  Vascular: 2+ radial pulses B/L  Dermatologic: skin warm, dry and intact; no rashes  Neurologic: A&Ox4; CNII-XII grossly intact; no focal deficits  Psychiatric: affect and characteristics of appearance, verbalizations, behaviors are appropriate #Discharge: do not delete    Patient is 46 yo F with past medical history of gastric ulcers, dysmenorrhea 2/2 undiagnosed endometriosis, 11 year history of GI pain associated with her menstrual cycle, and 1st trimester miscarriage in  presented with 6-day h/o abdominal pain, nausea, vomiting that was exacerbated yesterday morning after eating breakfast (rice, chicken).     Problem List/Main Diagnoses (system-based):     #abdominal pain, nausea, vomiting R/O gastritis, GERD, PUD, PMDD, endometriosis: LUQ pain rated at 20/10 in the ED, relieved with morphine. Vomited multiple times and given IV tylenol, IV pepcid, IV zofran, IVPB reglan, PO maalox in the ED. She has been on a bowel regimen and clear liquid diet. VS stable and PE unremarkable on the floor. Small hiatal hernia, mild diverticulitis/colitis in distal left colon/proximal sigmoid found on CT abdomen. s/p flagyl and ceftriaxone. No sx of diverticulitis. Educated pt on importance of fiber intake. Pain rated at 0/10 this morning. Has not vomited since last night. Given pantoprazole and miralax on the floor. Advanced to regular diet, which pt tolerated well without nausea or vomiting.    Dx pending H.pylori stool antigen, GI PCR results. F/u with outpatient GI.     #dysmenorrhea 2/2 undiagnosed endometriosis  -denies sx of PID, vaginitis; remote h/o CT (, treated); ; 2 C/S, miscarriage in 3/2020 at ~7 weeks, negative HCG, TVUS WNL (2021)  -f/u outpatient gynecologist for evaluation of endometriosis, trial of combined OCPs, avoid NSAIDs due to h/o gastric ulcers    #anxiety R/O panic attacks  -precedes her periods due to anticipation of pain, c/o palpitations, impending sense of doom  -refer to outpatient psychiatry    Inpatient treatment course: Pantoprazole, Miralax  Patient was discharged to: home.  New medications: None  Changes to old medications: None  Medications that were stopped: None    Items to Follow up as outpatient: Endometriosis, epigastric pain, anxiety    Physical exam at time of discharge:   Constitutional: WDWN resting comfortably in bed; NAD  Head: NC/AT  Eyes: PERRL, EOMI, anicteric sclera  ENT: no nasal discharge; uvula midline  Neck: supple; no JVD or thyromegaly  Respiratory: CTA B/L; no W/R/R, no use of accessory muscles  Cardiac: +S1/S2; RRR; no M/R/G  Gastrointestinal: abdomen soft, NT/ND; no rebound or guarding; +BSx4, horizontal scar at midline s/p 2 C/S  Back: spine midline, no CVAT B/L  Extremities: no clubbing or cyanosis; no peripheral edema  Musculoskeletal: no joint swelling, tenderness or erythema  Vascular: 2+ radial pulses B/L  Dermatologic: skin warm, dry and intact; no rashes  Neurologic: A&Ox4; CNII-XII grossly intact; no focal deficits  Psychiatric: affect and characteristics of appearance, verbalizations, behaviors are appropriate

## 2021-09-02 LAB
CULTURE RESULTS: SIGNIFICANT CHANGE UP
SPECIMEN SOURCE: SIGNIFICANT CHANGE UP

## 2021-09-03 DIAGNOSIS — K52.9 NONINFECTIVE GASTROENTERITIS AND COLITIS, UNSPECIFIED: ICD-10-CM

## 2021-09-03 DIAGNOSIS — K29.70 GASTRITIS, UNSPECIFIED, WITHOUT BLEEDING: ICD-10-CM

## 2021-09-03 DIAGNOSIS — N94.6 DYSMENORRHEA, UNSPECIFIED: ICD-10-CM

## 2021-09-03 DIAGNOSIS — F41.9 ANXIETY DISORDER, UNSPECIFIED: ICD-10-CM

## 2021-09-03 DIAGNOSIS — K44.9 DIAPHRAGMATIC HERNIA WITHOUT OBSTRUCTION OR GANGRENE: ICD-10-CM

## 2021-09-03 DIAGNOSIS — K57.32 DIVERTICULITIS OF LARGE INTESTINE WITHOUT PERFORATION OR ABSCESS WITHOUT BLEEDING: ICD-10-CM

## 2021-10-22 NOTE — H&P ADULT - CLICK TO LAUNCH ORM
80-year-old female brought in by the her grandmother to the emergency department today for a skin complaint. Grandmother reports that patient has had this rash all over her body including her scalp for \"a very long time. \"  Leilani November states that they are supposed to be seeing a dermatologist but the dermatologist cannot see them until February and she would like something done about it. Grandmother states she believes that rash is getting worse. Patient states she believes that has been the same. Patient reports using some prescription cream to the areas. The history is provided by the patient. Rash   This is a chronic problem. The problem is associated with nothing. There has been no fever. The rash is present on the scalp, left arm, left hand, right arm, right hand, left lower leg and right lower leg. The patient is experiencing no pain. She has tried steroid cream for the symptoms. The treatment provided no relief. Past Medical History:   Diagnosis Date    Borderline hyperlipidemia     Mental retardation     Mother states previous doctors have said she has all the characteristics of Down's Syndrome without the actual genetic component. She lives at home with mother, graduated high school with an occupational degree.  Obesity     Other ill-defined conditions(729.89)     Pt's mother states the pt. has a possible syndrome that hasn't been able to be identified by genetic studies. 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 She has a mental capacity of 15-16 year old. Had cardiac work-up at birth and found all to be normal, except for heart murmur at birth that has since been resolved.        Past Surgical History:   Procedure Laterality Date    HX ORTHOPAEDIC      digit removed from right hand          Family History:   Problem Relation Age of Onset    Diabetes Maternal Grandfather     Hypertension Maternal Grandfather     . Prostate Cancer Maternal Grandfather     Other Neg Hx     Post-op Cognitive Dysfunction Neg Hx     Emergence Delirium Neg Hx     Post-op Nausea/Vomiting Neg Hx     Delayed Awakening Neg Hx     Pseudocholinesterase Deficiency Neg Hx     Malignant Hyperthermia Neg Hx        Social History     Socioeconomic History    Marital status: SINGLE     Spouse name: Not on file    Number of children: Not on file    Years of education: Not on file    Highest education level: Not on file   Occupational History    Not on file   Tobacco Use    Smoking status: Never Smoker   Substance and Sexual Activity    Alcohol use: No    Drug use: Not on file    Sexual activity: Not on file   Other Topics Concern    Not on file   Social History Narrative    Not on file     Social Determinants of Health     Financial Resource Strain:     Difficulty of Paying Living Expenses:    Food Insecurity:     Worried About Running Out of Food in the Last Year:     Ran Out of Food in the Last Year:    Transportation Needs:     Lack of Transportation (Medical):  Lack of Transportation (Non-Medical):    Physical Activity:     Days of Exercise per Week:     Minutes of Exercise per Session:    Stress:     Feeling of Stress :    Social Connections:     Frequency of Communication with Friends and Family:     Frequency of Social Gatherings with Friends and Family:     Attends Yazidism Services:     Active Member of Clubs or Organizations:     Attends Club or Organization Meetings:     Marital Status:    Intimate Partner Violence:     Fear of Current or Ex-Partner:     Emotionally Abused:     Physically Abused:     Sexually Abused: ALLERGIES: Patient has no known allergies. Review of Systems   Constitutional: Negative for fever. Gastrointestinal: Negative for abdominal pain. Skin: Positive for rash. Neurological: Negative for dizziness. All other systems reviewed and are negative.       Vitals:    10/22/21 1614 10/22/21 1633   BP: (!) 145/84    Resp: 16    Temp: 98 °F (36.7 °C)    SpO2: 99%    Weight:  100.7 kg (222 lb)   Height:  5' (1.524 m)            Physical Exam  Vitals and nursing note reviewed. Constitutional:       General: She is not in acute distress. Appearance: Normal appearance. She is not ill-appearing, toxic-appearing or diaphoretic. HENT:      Head: Normocephalic and atraumatic. Right Ear: External ear normal.      Left Ear: External ear normal.      Mouth/Throat:      Mouth: Mucous membranes are moist.      Pharynx: Oropharynx is clear. Eyes:      General: No scleral icterus. Extraocular Movements: Extraocular movements intact. Conjunctiva/sclera: Conjunctivae normal.   Cardiovascular:      Rate and Rhythm: Normal rate. Pulses: Normal pulses. Pulmonary:      Effort: Pulmonary effort is normal. No respiratory distress. Abdominal:      General: Abdomen is flat. There is no distension. Musculoskeletal:         General: Normal range of motion. Cervical back: Normal range of motion and neck supple. No rigidity. Right lower leg: No edema. Left lower leg: No edema. Skin:     General: Skin is warm and dry. Capillary Refill: Capillary refill takes less than 2 seconds. Findings: Rash present. Rash is scaling. Comments: Scaling, erythematous plaques noted to bilateral arms, hands, and legs on exam. Patient does have some areas of rash that appears cracked on hands. No drainage. No signs of infection on exam.    Neurological:      General: No focal deficit present. Mental Status: She is alert and oriented to person, place, and time. Psychiatric:         Mood and Affect: Mood normal.         Behavior: Behavior normal.         Thought Content:  Thought content normal.         Judgment: Judgment normal.          MDM  Number of Diagnoses or Management Options  Psoriasis: minor  Diagnosis management comments: Overall well-appearing 59-year-old female presents to the emergency department today brought in by her grandmother for complaint of rash. Physical exam reveals scaling plaques noted to her arms, hands, and legs. Rashes consistent with psoriasis. Grandmother states patient is seeing a dermatologist in February but she does not want to wait that long for treatment. I have discussed different treatment options that the dermatologist may try and have discussed her why these need to be prescribed by a dermatologist.  Patient denies any complaints at time of exam.  Raffi Mcpherson believes it is getting worse and spreading up her arms. No signs of secondary infection. Encouraged her to call the dermatologist to see if they can get a sooner appointment. I have discussed the results of all labs, procedures, radiographs, and/or treatments with the patient and available family members. Eusebia Bryant is agreed upon by the patient and the patient is ready for discharge.  Questions about treatment in the ED and differential diagnosis of presenting condition were answered. Rosita Vance was given verbal discharge instructions including, but not limited to, importance of returning to the emergency department for any concern of worsening or continued symptoms.  Instructions were given to follow up with a primary care provider or specialist within 1-2 days. Pb [place] effects of medications, if prescribed, were discussed and patient was advised to refrain from significant physical activity until followed up by primary care physician and to not drive or operate heavy machinery after taking any sedating substances.      Joe Womack NP; 10/22/2021 @4:56 PM Voice dictation software was used during the making of  this note. This software is not perfect and grammatical and other typographical errors  may be present. This note has not been proofread for errors.       Risk of Complications, Morbidity, and/or Mortality  Presenting problems: low  Diagnostic procedures: low  Management options: low    Patient Progress  Patient progress: improved         Procedures

## 2022-07-07 NOTE — ED PROVIDER NOTE - CLINICAL SUMMARY MEDICAL DECISION MAKING FREE TEXT BOX
45F with a h/o endometriosis and PUD who p/w nausea, vomits and generalized abdominal discomfort in the setting of getting her period today. Pt states she frequently gets these symptoms with the onset of her periods, she has been on ocps and had an IUD in the past to control these symptoms, but currently is not on anything. She tried taking ODT zofran and tylenol at home PTA but was not able to keep these down due to n/v. Denies heavy bleeding at this time. No DC. No f/c, no cp/sob, no other complaints at this time. Complex Repair Preamble Text (Leave Blank If You Do Not Want): Extensive wide undermining was performed. 45F with a h/o endometriosis and PUD who p/w nausea, vomits and generalized abdominal discomfort in the setting of getting her period today. Pt states she frequently gets these symptoms with the onset of her periods, she has been on ocps and had an IUD in the past to control these symptoms, but currently is not on anything. She tried taking ODT zofran and tylenol at home PTA but was not able to keep these down due to n/v. Denies heavy bleeding at this time. No DC. No f/c, no cp/sob, no other complaints at this time.    On exam, VSS, pt actively vomiting, nonbloody and appears very uncomfortable 2/2 n/v, no sig abd TTP, nonsurgical, no active hemorrhage. Will check labs and hydrate, and given antiemetics and Dilaudid for pain, which patient states helps her a great deal when she has these symptoms.

## 2022-09-14 NOTE — ED PROVIDER NOTE - DATE/TIME 2
J-Code Units (100 Units Per Syringe): 100 Detail Level: None Ilumya Amount: 100 mg Use Enhanced Ndc?: Yes Expiration Date (Optional): 12/2024 Additional Comments: Patient will continue Ilumya 100mg SC q 12 weeks Include J-Code In Bill: No J-Code:  Syringe Size Used (Required For Enhanced Ndc): 100mg/ml prefilled syringe Lot # (Optional): [id number] Consent: The risks of pain and injection site reactions were reviewed with the patient prior to the injection. Administered By (Optional): dejuan Ndc (100 Mg/Ml Syringe): [id number] 30-Apr-2018 22:22

## 2022-12-02 ENCOUNTER — EMERGENCY (EMERGENCY)
Facility: HOSPITAL | Age: 47
LOS: 1 days | Discharge: HOME | End: 2022-12-02
Attending: STUDENT IN AN ORGANIZED HEALTH CARE EDUCATION/TRAINING PROGRAM | Admitting: STUDENT IN AN ORGANIZED HEALTH CARE EDUCATION/TRAINING PROGRAM
Payer: MEDICAID

## 2022-12-02 ENCOUNTER — EMERGENCY (EMERGENCY)
Facility: HOSPITAL | Age: 47
LOS: 1 days | Discharge: HOME | End: 2022-12-02
Attending: EMERGENCY MEDICINE | Admitting: EMERGENCY MEDICINE
Payer: COMMERCIAL

## 2022-12-02 VITALS
DIASTOLIC BLOOD PRESSURE: 86 MMHG | SYSTOLIC BLOOD PRESSURE: 182 MMHG | TEMPERATURE: 99 F | WEIGHT: 203.05 LBS | RESPIRATION RATE: 18 BRPM | HEIGHT: 70 IN | OXYGEN SATURATION: 100 % | HEART RATE: 96 BPM

## 2022-12-02 VITALS
DIASTOLIC BLOOD PRESSURE: 80 MMHG | TEMPERATURE: 99 F | OXYGEN SATURATION: 99 % | RESPIRATION RATE: 15 BRPM | SYSTOLIC BLOOD PRESSURE: 148 MMHG | HEART RATE: 74 BPM

## 2022-12-02 VITALS
SYSTOLIC BLOOD PRESSURE: 170 MMHG | RESPIRATION RATE: 17 BRPM | WEIGHT: 203.05 LBS | TEMPERATURE: 98 F | DIASTOLIC BLOOD PRESSURE: 77 MMHG | OXYGEN SATURATION: 99 % | HEIGHT: 70 IN | HEART RATE: 78 BPM

## 2022-12-02 DIAGNOSIS — R10.9 UNSPECIFIED ABDOMINAL PAIN: ICD-10-CM

## 2022-12-02 DIAGNOSIS — Z20.822 CONTACT WITH AND (SUSPECTED) EXPOSURE TO COVID-19: ICD-10-CM

## 2022-12-02 DIAGNOSIS — R11.2 NAUSEA WITH VOMITING, UNSPECIFIED: ICD-10-CM

## 2022-12-02 DIAGNOSIS — R07.89 OTHER CHEST PAIN: ICD-10-CM

## 2022-12-02 DIAGNOSIS — O34.591 MATERNAL CARE FOR OTHER ABNORMALITIES OF GRAVID UTERUS, FIRST TRIMESTER: ICD-10-CM

## 2022-12-02 DIAGNOSIS — R30.0 DYSURIA: ICD-10-CM

## 2022-12-02 DIAGNOSIS — Z86.19 PERSONAL HISTORY OF OTHER INFECTIOUS AND PARASITIC DISEASES: ICD-10-CM

## 2022-12-02 DIAGNOSIS — Z87.59 PERSONAL HISTORY OF OTHER COMPLICATIONS OF PREGNANCY, CHILDBIRTH AND THE PUERPERIUM: ICD-10-CM

## 2022-12-02 DIAGNOSIS — Z98.891 HISTORY OF UTERINE SCAR FROM PREVIOUS SURGERY: Chronic | ICD-10-CM

## 2022-12-02 DIAGNOSIS — O21.9 VOMITING OF PREGNANCY, UNSPECIFIED: ICD-10-CM

## 2022-12-02 DIAGNOSIS — O03.9 COMPLETE OR UNSPECIFIED SPONTANEOUS ABORTION WITHOUT COMPLICATION: ICD-10-CM

## 2022-12-02 DIAGNOSIS — N80.9 ENDOMETRIOSIS, UNSPECIFIED: ICD-10-CM

## 2022-12-02 DIAGNOSIS — Z87.11 PERSONAL HISTORY OF PEPTIC ULCER DISEASE: ICD-10-CM

## 2022-12-02 DIAGNOSIS — N92.0 EXCESSIVE AND FREQUENT MENSTRUATION WITH REGULAR CYCLE: ICD-10-CM

## 2022-12-02 DIAGNOSIS — Z87.19 PERSONAL HISTORY OF OTHER DISEASES OF THE DIGESTIVE SYSTEM: ICD-10-CM

## 2022-12-02 LAB
ALBUMIN SERPL ELPH-MCNC: 4.4 G/DL — SIGNIFICANT CHANGE UP (ref 3.3–5)
ALBUMIN SERPL ELPH-MCNC: 4.7 G/DL — SIGNIFICANT CHANGE UP (ref 3.3–5)
ALP SERPL-CCNC: 100 U/L — SIGNIFICANT CHANGE UP (ref 40–120)
ALP SERPL-CCNC: 87 U/L — SIGNIFICANT CHANGE UP (ref 40–120)
ALT FLD-CCNC: 24 U/L — SIGNIFICANT CHANGE UP (ref 10–45)
ALT FLD-CCNC: SIGNIFICANT CHANGE UP (ref 10–45)
ANION GAP SERPL CALC-SCNC: 14 MMOL/L — SIGNIFICANT CHANGE UP (ref 5–17)
ANION GAP SERPL CALC-SCNC: 14 MMOL/L — SIGNIFICANT CHANGE UP (ref 5–17)
APTT BLD: 33.8 SEC — SIGNIFICANT CHANGE UP (ref 27.5–35.5)
AST SERPL-CCNC: 23 U/L — SIGNIFICANT CHANGE UP (ref 10–40)
AST SERPL-CCNC: SIGNIFICANT CHANGE UP (ref 10–40)
BASOPHILS # BLD AUTO: 0.03 K/UL — SIGNIFICANT CHANGE UP (ref 0–0.2)
BASOPHILS # BLD AUTO: 0.03 K/UL — SIGNIFICANT CHANGE UP (ref 0–0.2)
BASOPHILS NFR BLD AUTO: 0.3 % — SIGNIFICANT CHANGE UP (ref 0–2)
BASOPHILS NFR BLD AUTO: 0.3 % — SIGNIFICANT CHANGE UP (ref 0–2)
BILIRUB SERPL-MCNC: 0.4 MG/DL — SIGNIFICANT CHANGE UP (ref 0.2–1.2)
BILIRUB SERPL-MCNC: 0.7 MG/DL — SIGNIFICANT CHANGE UP (ref 0.2–1.2)
BLD GP AB SCN SERPL QL: NEGATIVE — SIGNIFICANT CHANGE UP
BLD GP AB SCN SERPL QL: NEGATIVE — SIGNIFICANT CHANGE UP
BUN SERPL-MCNC: 12 MG/DL — SIGNIFICANT CHANGE UP (ref 7–23)
BUN SERPL-MCNC: 12 MG/DL — SIGNIFICANT CHANGE UP (ref 7–23)
CALCIUM SERPL-MCNC: 9.4 MG/DL — SIGNIFICANT CHANGE UP (ref 8.4–10.5)
CALCIUM SERPL-MCNC: 9.5 MG/DL — SIGNIFICANT CHANGE UP (ref 8.4–10.5)
CHLORIDE SERPL-SCNC: 94 MMOL/L — LOW (ref 96–108)
CHLORIDE SERPL-SCNC: 96 MMOL/L — SIGNIFICANT CHANGE UP (ref 96–108)
CO2 SERPL-SCNC: 24 MMOL/L — SIGNIFICANT CHANGE UP (ref 22–31)
CO2 SERPL-SCNC: 26 MMOL/L — SIGNIFICANT CHANGE UP (ref 22–31)
CREAT SERPL-MCNC: 0.72 MG/DL — SIGNIFICANT CHANGE UP (ref 0.5–1.3)
CREAT SERPL-MCNC: 0.85 MG/DL — SIGNIFICANT CHANGE UP (ref 0.5–1.3)
EGFR: 104 ML/MIN/1.73M2 — SIGNIFICANT CHANGE UP
EGFR: 85 ML/MIN/1.73M2 — SIGNIFICANT CHANGE UP
EOSINOPHIL # BLD AUTO: 0 K/UL — SIGNIFICANT CHANGE UP (ref 0–0.5)
EOSINOPHIL # BLD AUTO: 0.02 K/UL — SIGNIFICANT CHANGE UP (ref 0–0.5)
EOSINOPHIL NFR BLD AUTO: 0 % — SIGNIFICANT CHANGE UP (ref 0–6)
EOSINOPHIL NFR BLD AUTO: 0.2 % — SIGNIFICANT CHANGE UP (ref 0–6)
GLUCOSE SERPL-MCNC: 153 MG/DL — HIGH (ref 70–99)
GLUCOSE SERPL-MCNC: 154 MG/DL — HIGH (ref 70–99)
HCG SERPL-ACNC: 13 MIU/ML — HIGH
HCG SERPL-ACNC: 7 MIU/ML — HIGH
HCT VFR BLD CALC: 42.6 % — SIGNIFICANT CHANGE UP (ref 34.5–45)
HCT VFR BLD CALC: 43.4 % — SIGNIFICANT CHANGE UP (ref 34.5–45)
HGB BLD-MCNC: 14.1 G/DL — SIGNIFICANT CHANGE UP (ref 11.5–15.5)
HGB BLD-MCNC: 14.2 G/DL — SIGNIFICANT CHANGE UP (ref 11.5–15.5)
IMM GRANULOCYTES NFR BLD AUTO: 0.3 % — SIGNIFICANT CHANGE UP (ref 0–0.9)
IMM GRANULOCYTES NFR BLD AUTO: 0.3 % — SIGNIFICANT CHANGE UP (ref 0–0.9)
INR BLD: 1.16 — SIGNIFICANT CHANGE UP (ref 0.88–1.16)
LIDOCAIN IGE QN: 38 U/L — SIGNIFICANT CHANGE UP (ref 7–60)
LYMPHOCYTES # BLD AUTO: 1.51 K/UL — SIGNIFICANT CHANGE UP (ref 1–3.3)
LYMPHOCYTES # BLD AUTO: 1.7 K/UL — SIGNIFICANT CHANGE UP (ref 1–3.3)
LYMPHOCYTES # BLD AUTO: 13.9 % — SIGNIFICANT CHANGE UP (ref 13–44)
LYMPHOCYTES # BLD AUTO: 15.8 % — SIGNIFICANT CHANGE UP (ref 13–44)
MAGNESIUM SERPL-MCNC: 2 MG/DL — SIGNIFICANT CHANGE UP (ref 1.6–2.6)
MCHC RBC-ENTMCNC: 29.4 PG — SIGNIFICANT CHANGE UP (ref 27–34)
MCHC RBC-ENTMCNC: 29.6 PG — SIGNIFICANT CHANGE UP (ref 27–34)
MCHC RBC-ENTMCNC: 32.5 GM/DL — SIGNIFICANT CHANGE UP (ref 32–36)
MCHC RBC-ENTMCNC: 33.3 GM/DL — SIGNIFICANT CHANGE UP (ref 32–36)
MCV RBC AUTO: 88.9 FL — SIGNIFICANT CHANGE UP (ref 80–100)
MCV RBC AUTO: 90.6 FL — SIGNIFICANT CHANGE UP (ref 80–100)
MONOCYTES # BLD AUTO: 0.56 K/UL — SIGNIFICANT CHANGE UP (ref 0–0.9)
MONOCYTES # BLD AUTO: 0.84 K/UL — SIGNIFICANT CHANGE UP (ref 0–0.9)
MONOCYTES NFR BLD AUTO: 5.1 % — SIGNIFICANT CHANGE UP (ref 2–14)
MONOCYTES NFR BLD AUTO: 7.8 % — SIGNIFICANT CHANGE UP (ref 2–14)
NEUTROPHILS # BLD AUTO: 8.12 K/UL — HIGH (ref 1.8–7.4)
NEUTROPHILS # BLD AUTO: 8.76 K/UL — HIGH (ref 1.8–7.4)
NEUTROPHILS NFR BLD AUTO: 75.6 % — SIGNIFICANT CHANGE UP (ref 43–77)
NEUTROPHILS NFR BLD AUTO: 80.4 % — HIGH (ref 43–77)
NRBC # BLD: 0 /100 WBCS — SIGNIFICANT CHANGE UP (ref 0–0)
NRBC # BLD: 0 /100 WBCS — SIGNIFICANT CHANGE UP (ref 0–0)
PHOSPHATE SERPL-MCNC: 2.6 MG/DL — SIGNIFICANT CHANGE UP (ref 2.5–4.5)
PLATELET # BLD AUTO: 280 K/UL — SIGNIFICANT CHANGE UP (ref 150–400)
PLATELET # BLD AUTO: 386 K/UL — SIGNIFICANT CHANGE UP (ref 150–400)
POTASSIUM SERPL-MCNC: 3 MMOL/L — LOW (ref 3.5–5.3)
POTASSIUM SERPL-MCNC: SIGNIFICANT CHANGE UP (ref 3.5–5.3)
POTASSIUM SERPL-SCNC: 3 MMOL/L — LOW (ref 3.5–5.3)
POTASSIUM SERPL-SCNC: SIGNIFICANT CHANGE UP (ref 3.5–5.3)
PROT SERPL-MCNC: 8.5 G/DL — HIGH (ref 6–8.3)
PROT SERPL-MCNC: 9.1 G/DL — HIGH (ref 6–8.3)
PROTHROM AB SERPL-ACNC: 13.8 SEC — HIGH (ref 10.5–13.4)
RBC # BLD: 4.79 M/UL — SIGNIFICANT CHANGE UP (ref 3.8–5.2)
RBC # BLD: 4.79 M/UL — SIGNIFICANT CHANGE UP (ref 3.8–5.2)
RBC # FLD: 13 % — SIGNIFICANT CHANGE UP (ref 10.3–14.5)
RBC # FLD: 13.2 % — SIGNIFICANT CHANGE UP (ref 10.3–14.5)
RH IG SCN BLD-IMP: POSITIVE — SIGNIFICANT CHANGE UP
RH IG SCN BLD-IMP: POSITIVE — SIGNIFICANT CHANGE UP
SARS-COV-2 RNA SPEC QL NAA+PROBE: NEGATIVE — SIGNIFICANT CHANGE UP
SARS-COV-2 RNA SPEC QL NAA+PROBE: NEGATIVE — SIGNIFICANT CHANGE UP
SODIUM SERPL-SCNC: 132 MMOL/L — LOW (ref 135–145)
SODIUM SERPL-SCNC: 136 MMOL/L — SIGNIFICANT CHANGE UP (ref 135–145)
WBC # BLD: 10.74 K/UL — HIGH (ref 3.8–10.5)
WBC # BLD: 10.89 K/UL — HIGH (ref 3.8–10.5)
WBC # FLD AUTO: 10.74 K/UL — HIGH (ref 3.8–10.5)
WBC # FLD AUTO: 10.89 K/UL — HIGH (ref 3.8–10.5)

## 2022-12-02 PROCEDURE — 86850 RBC ANTIBODY SCREEN: CPT

## 2022-12-02 PROCEDURE — 36415 COLL VENOUS BLD VENIPUNCTURE: CPT

## 2022-12-02 PROCEDURE — 76801 OB US < 14 WKS SINGLE FETUS: CPT

## 2022-12-02 PROCEDURE — 83690 ASSAY OF LIPASE: CPT

## 2022-12-02 PROCEDURE — 86900 BLOOD TYPING SEROLOGIC ABO: CPT

## 2022-12-02 PROCEDURE — 84702 CHORIONIC GONADOTROPIN TEST: CPT

## 2022-12-02 PROCEDURE — 99284 EMERGENCY DEPT VISIT MOD MDM: CPT | Mod: 25

## 2022-12-02 PROCEDURE — 85730 THROMBOPLASTIN TIME PARTIAL: CPT

## 2022-12-02 PROCEDURE — 85610 PROTHROMBIN TIME: CPT

## 2022-12-02 PROCEDURE — 80053 COMPREHEN METABOLIC PANEL: CPT

## 2022-12-02 PROCEDURE — 76817 TRANSVAGINAL US OBSTETRIC: CPT | Mod: 26

## 2022-12-02 PROCEDURE — 76817 TRANSVAGINAL US OBSTETRIC: CPT

## 2022-12-02 PROCEDURE — 87635 SARS-COV-2 COVID-19 AMP PRB: CPT

## 2022-12-02 PROCEDURE — 71045 X-RAY EXAM CHEST 1 VIEW: CPT | Mod: 26

## 2022-12-02 PROCEDURE — 85025 COMPLETE CBC W/AUTO DIFF WBC: CPT

## 2022-12-02 PROCEDURE — 86901 BLOOD TYPING SEROLOGIC RH(D): CPT

## 2022-12-02 PROCEDURE — 76801 OB US < 14 WKS SINGLE FETUS: CPT | Mod: 26

## 2022-12-02 PROCEDURE — 99285 EMERGENCY DEPT VISIT HI MDM: CPT

## 2022-12-02 PROCEDURE — 96374 THER/PROPH/DIAG INJ IV PUSH: CPT

## 2022-12-02 PROCEDURE — 96375 TX/PRO/DX INJ NEW DRUG ADDON: CPT

## 2022-12-02 PROCEDURE — 99284 EMERGENCY DEPT VISIT MOD MDM: CPT

## 2022-12-02 RX ORDER — FAMOTIDINE 10 MG/ML
20 INJECTION INTRAVENOUS ONCE
Refills: 0 | Status: COMPLETED | OUTPATIENT
Start: 2022-12-02 | End: 2022-12-02

## 2022-12-02 RX ORDER — METOCLOPRAMIDE HCL 10 MG
10 TABLET ORAL ONCE
Refills: 0 | Status: COMPLETED | OUTPATIENT
Start: 2022-12-02 | End: 2022-12-02

## 2022-12-02 RX ORDER — HYDROMORPHONE HYDROCHLORIDE 2 MG/ML
0.5 INJECTION INTRAMUSCULAR; INTRAVENOUS; SUBCUTANEOUS ONCE
Refills: 0 | Status: DISCONTINUED | OUTPATIENT
Start: 2022-12-02 | End: 2022-12-02

## 2022-12-02 RX ORDER — SODIUM CHLORIDE 9 MG/ML
1000 INJECTION INTRAMUSCULAR; INTRAVENOUS; SUBCUTANEOUS ONCE
Refills: 0 | Status: COMPLETED | OUTPATIENT
Start: 2022-12-02 | End: 2022-12-02

## 2022-12-02 RX ORDER — HYDROMORPHONE HYDROCHLORIDE 2 MG/ML
1 INJECTION INTRAMUSCULAR; INTRAVENOUS; SUBCUTANEOUS ONCE
Refills: 0 | Status: DISCONTINUED | OUTPATIENT
Start: 2022-12-02 | End: 2022-12-02

## 2022-12-02 RX ORDER — ONDANSETRON 8 MG/1
1 TABLET, FILM COATED ORAL
Qty: 12 | Refills: 0
Start: 2022-12-02 | End: 2022-12-05

## 2022-12-02 RX ORDER — POTASSIUM CHLORIDE 20 MEQ
40 PACKET (EA) ORAL ONCE
Refills: 0 | Status: COMPLETED | OUTPATIENT
Start: 2022-12-02 | End: 2022-12-02

## 2022-12-02 RX ORDER — OXYCODONE AND ACETAMINOPHEN 5; 325 MG/1; MG/1
1 TABLET ORAL
Qty: 8 | Refills: 0
Start: 2022-12-02 | End: 2022-12-03

## 2022-12-02 RX ADMIN — Medication 10 MILLIGRAM(S): at 22:09

## 2022-12-02 RX ADMIN — SODIUM CHLORIDE 1000 MILLILITER(S): 9 INJECTION INTRAMUSCULAR; INTRAVENOUS; SUBCUTANEOUS at 00:53

## 2022-12-02 RX ADMIN — HYDROMORPHONE HYDROCHLORIDE 1 MILLIGRAM(S): 2 INJECTION INTRAMUSCULAR; INTRAVENOUS; SUBCUTANEOUS at 22:09

## 2022-12-02 RX ADMIN — FAMOTIDINE 20 MILLIGRAM(S): 10 INJECTION INTRAVENOUS at 22:09

## 2022-12-02 RX ADMIN — SODIUM CHLORIDE 1000 MILLILITER(S): 9 INJECTION INTRAMUSCULAR; INTRAVENOUS; SUBCUTANEOUS at 22:09

## 2022-12-02 RX ADMIN — Medication 40 MILLIEQUIVALENT(S): at 01:48

## 2022-12-02 RX ADMIN — Medication 104 MILLIGRAM(S): at 00:53

## 2022-12-02 RX ADMIN — HYDROMORPHONE HYDROCHLORIDE 0.5 MILLIGRAM(S): 2 INJECTION INTRAMUSCULAR; INTRAVENOUS; SUBCUTANEOUS at 00:53

## 2022-12-02 RX ADMIN — HYDROMORPHONE HYDROCHLORIDE 0.5 MILLIGRAM(S): 2 INJECTION INTRAMUSCULAR; INTRAVENOUS; SUBCUTANEOUS at 01:03

## 2022-12-02 RX ADMIN — FAMOTIDINE 20 MILLIGRAM(S): 10 INJECTION INTRAVENOUS at 00:53

## 2022-12-02 RX ADMIN — SODIUM CHLORIDE 1000 MILLILITER(S): 9 INJECTION INTRAMUSCULAR; INTRAVENOUS; SUBCUTANEOUS at 01:55

## 2022-12-02 RX ADMIN — Medication 10 MILLIGRAM(S): at 01:08

## 2022-12-02 NOTE — ED PROVIDER NOTE - PROGRESS NOTE DETAILS
feeling much better, no vomiting throughout ED stay. complete miscarriage, no POC on US.   I have discussed the discharge plan with the patient. The patient agrees with the plan, as discussed.  The patient understands Emergency Department diagnosis is a preliminary diagnosis often based on limited information and that the patient must adhere to the follow-up plan as discussed.  The patient understands that if the symptoms worsen or if prescribed medications do not have the desired/planned effect that the patient may return to the Emergency Department at any time for further evaluation and treatment.

## 2022-12-02 NOTE — ED ADULT NURSE NOTE - NSIMPLEMENTINTERV_GEN_ALL_ED
Implemented All Universal Safety Interventions:  [place] to call system. Call bell, personal items and telephone within reach. Instruct patient to call for assistance. Room bathroom lighting operational. Non-slip footwear when patient is off stretcher. Physically safe environment: no spills, clutter or unnecessary equipment. Stretcher in lowest position, wheels locked, appropriate side rails in place. Bilobed Flap Text: The defect edges were debeveled with a #15 scalpel blade.  Given the location of the defect and the proximity to free margins a bilobe flap was deemed most appropriate.  Using a sterile surgical marker, an appropriate bilobe flap drawn around the defect.    The area thus outlined was incised deep to adipose tissue with a #15 scalpel blade.  The skin margins were undermined to an appropriate distance in all directions utilizing iris scissors.

## 2022-12-02 NOTE — ED PROVIDER NOTE - PATIENT PORTAL LINK FT
You can access the FollowMyHealth Patient Portal offered by [facility] by registering at the following website: [email or web address]. By joining [facility]’s FollowMyHealth portal, you will also be able to view your health information using other applications (apps) compatible with our system. You can access the FollowMyHealth Patient Portal offered by [facility] by registering at the following website: [email or web address]. By joining "[employer]’s FollowMyHealth portal, you will also be able to view your health information using other applications (apps) compatible with our system.

## 2022-12-02 NOTE — ED ADULT NURSE REASSESSMENT NOTE - NS ED NURSE REASSESS COMMENT FT1
pt. ambulating to restroom with steady gait, awaiting dispo.
resting quietly, nad, assessment on-going
to us per us tech, accompanied by spouse

## 2022-12-02 NOTE — ED ADULT NURSE NOTE - NSIMPLEMENTINTERV_GEN_ALL_ED
Implemented All Fall Risk Interventions:  [place] to call system. Call bell, personal items and telephone within reach. Instruct patient to call for assistance. Room bathroom lighting operational. Non-slip footwear when patient is off stretcher. Physically safe environment: no spills, clutter or unnecessary equipment. Stretcher in lowest position, wheels locked, appropriate side rails in place. Provide visual cue, wrist band, yellow gown, etc. Monitor gait and stability. Monitor for mental status changes and reorient to person, place, and time. Review medications for side effects contributing to fall risk. Reinforce activity limits and safety measures with patient and family.

## 2022-12-02 NOTE — ED PROVIDER NOTE - CLINICAL SUMMARY MEDICAL DECISION MAKING FREE TEXT BOX
47F c PMH of chronic gastric ulcer, endometriosis and PSH of CS p/w diffuse abdominal pain with associated NBN n/v. Had recent miscarriage, was seen yesterday in ED and tvus showed empty uterus was discharged after dx of complete . On exam, /77, VS otherwise wnl, abdomen soft ndnt.    ddx: uti vs endometriosis vs pancreatitis vs pud vs acs    Plan:  - lab  - UA  - ekg  - cxr  - labs  - ivf  - dilaudid/pepcid/reglan  - pt states she had CTAP a few days ago, will see if can obtain results as abdomen non-tender in ED, will defer CT for now  - reassess

## 2022-12-02 NOTE — ED PROVIDER NOTE - NSICDXPASTSURGICALHX_GEN_ALL_CORE_FT
PAST SURGICAL HISTORY:  Cytomegalovirus infection not present No significant past surgical history    H/O  section

## 2022-12-02 NOTE — ED PROVIDER NOTE - PROGRESS NOTE DETAILS
pt feels much better. results discussed. d/c home stable. faheem: pt was received at sign out as needing more sleep/obs (pt has been in ed x 11.5 hrs), c/o n/v - was seen in ed twice for same, was given ivf and multiple rounds of meds. pt nad, sleeping comfortably, stable for dc -- pt states "I want to stay here until I'm better", all results discussed w/pt - explained that no medical indication for admission, labs wnl. stable for dc, to f/u w/her pmd or gyn after being told that she's dc'd - pt yelling, cursing, threatening staff -- states "you're all gonna pay for this, you're all gonna hear from my ", spoken to my me and dr pitts, con't to be very aggressive and non cooperative, security called to assist w/dc

## 2022-12-02 NOTE — ED ADULT NURSE NOTE - OBJECTIVE STATEMENT
pt c/o vomiting x 2 days, abd pain. pt reports miscarriage last week (at ob office) @7weeks    per triage nurse, states she just left another hospital in [place] for same, and all they gave her was Valium, "because of my insurance?"

## 2022-12-02 NOTE — ED PROVIDER NOTE - OBJECTIVE STATEMENT
47F c PMH of 47F c PMH of chronic gastric ulcer, endometriosis and PSH of CS p/w diffuse abdominal pain with associated NBN n/v. Pt states that on 2022 she was told she was having a miscarriage and planned on expectant management. On  (yesterday), pt had gone to ED in [place] where a CT was performed with unknown results. Then came afterward to this ED yesterday and was seen, at that time K was 3.0, hcg 13, rh+, and tvus showed: No intrauterine or extrauterine gestation is visualized, and pt was dx with complete . Pt felt improved after dilaudid, reglan, and pepcid, returned home, then had increased emesis and diffuse abdominal pain so came to ED. reports minimal vaginal spotting, denies fever/diarrhea. last BM today was wnl. reports some dysuria.

## 2022-12-02 NOTE — ED PROVIDER NOTE - PATIENT PORTAL LINK FT
You can access the FollowMyHealth Patient Portal offered by [facility] by registering at the following website: [email or web address]. By joining [facility]’s FollowMyHealth portal, you will also be able to view your health information using other applications (apps) compatible with our system.

## 2022-12-02 NOTE — ED PROVIDER NOTE - CLINICAL SUMMARY MEDICAL DECISION MAKING FREE TEXT BOX
LMP 10/2, vaginal bleeding, n/v, afebrile, no guarding, no rebound  -check labs  -ivf, reglan, pepcid, dilaudid  -US

## 2022-12-02 NOTE — ED PROVIDER NOTE - OBJECTIVE STATEMENT
47F no PMH, , LMP 10/2, c/o vomiting and abd pain. pt states has been having vaginal bleeding and cramping for past 3 days. +nausea and vomiting. unable to hold anything down. no fevers. no diarrhea. pt states was seen at [facility], and was given valium and zofran, told having a miscarriage. pt c/o still feeling nauseated.

## 2022-12-02 NOTE — ED ADULT NURSE NOTE - OBJECTIVE STATEMENT
Pt presented to ED with c/o of diffuse abdominal pain with associated NBN n/v. AOX4. Patient denies chest pain, discomfort, shortness of breath, difficulty breathing and any form of distress not noted. Patient oriented to ED area. All needs attended. Rounding in progress. Fall risk precautions maintained.

## 2022-12-02 NOTE — ED ADULT TRIAGE NOTE - CHIEF COMPLAINT QUOTE
Pt presents to ED c/o vomiting, lower abdominal pain /cramping. reports had miscarriage last week at 7 weeks pregnant. Seen here last night for similar complaint, reports continuing vomiting and pain.

## 2022-12-02 NOTE — ED PROVIDER NOTE - NSFOLLOWUPINSTRUCTIONS_ED_ALL_ED_FT
ACUTE NAUSEA AND VOMITING - AfterCare(R) Instructions(ER/ED)            Acute Nausea and Vomiting    WHAT YOU NEED TO KNOW:    Acute means the nausea and vomiting starts suddenly, gets worse quickly, and lasts a short time. There are many possible causes of acute nausea and vomiting.    DISCHARGE INSTRUCTIONS:    Call your local emergency number (911 in the [place]) if:   •You have chest pain.      •You have severe pain or cramping in your abdomen.      •Your vision is blurred.      •You are confused, have a high fever, or a stiff neck.      •You have bright red blood coming from your rectum.      •Your vomit smells like bowel movement.      Return to the emergency department if:   •You have a severe headache or pain.      •You are dizzy, cold, and thirsty, and your eyes and mouth are dry.      •You are urinating very little or not at all.      •You are dizzy or lightheaded when you stand up.      •You see blood or material that looks like coffee grounds in your vomit.      Call your doctor if:   •You continue to vomit for more than 48 hours.      •Your nausea and vomiting does not get better or go away after you use medicine.      •You have questions or concerns about your condition or care.      Medicines: You may need any of the following:   •Medicines may be given to calm your stomach and stop your vomiting. You may also need medicines to help empty your stomach and bowels.      •Take your medicine as directed. Contact your healthcare provider if you think your medicine is not helping or if you have side effects. Tell your provider if you are allergic to any medicine. Keep a list of the medicines, vitamins, and herbs you take. Include the amounts, and when and why you take them. Bring the list or the pill bottles to follow-up visits. Carry your medicine list with you in case of an emergency.      Manage your symptoms:   •Rest as much as you can. Too much activity can make your nausea worse.      •Drink more liquids to prevent dehydration. Take small sips. Try drinks such as ginger ale, lemonade, water, or tea. Your provider may recommend that you drink an oral rehydration solution (ORS). ORS contains water, salts, and sugar that are needed to replace the lost body fluids.      •Eat smaller meals, more often. Try bland foods and avoid spices or strong flavors      •Do not drink alcohol. Alcohol may upset or irritate your stomach.      Follow up with your doctor as directed: Write down your questions so you remember to ask them during your visits.                                           Abdominal Pain, Adult      Pain in the abdomen (abdominal pain) can be caused by many things. Often, abdominal pain is not serious and it gets better with no treatment or by being treated at home. However, sometimes abdominal pain is serious.    Your health care provider will ask questions about your medical history and do a physical exam to try to determine the cause of your abdominal pain.      Follow these instructions at home:    Medicines     •Take over-the-counter and prescription medicines only as told by your health care provider.      • Do not take a laxative unless told by your health care provider.        General instructions      •Watch your condition for any changes.      •Drink enough fluid to keep your urine pale yellow.      •Keep all follow-up visits as told by your health care provider. This is important.        Contact a health care provider if:    •Your abdominal pain changes or gets worse.      •You are not hungry or you lose weight without trying.      •You are constipated or have diarrhea for more than 2–3 days.      •You have pain when you urinate or have a bowel movement.      •Your abdominal pain wakes you up at night.      •Your pain gets worse with meals, after eating, or with certain foods.      •You are vomiting and cannot keep anything down.      •You have a fever.      •You have blood in your urine.        Get help right away if:    •Your pain does not go away as soon as your health care provider told you to expect.      •You cannot stop vomiting.      •Your pain is only in areas of the abdomen, such as the right side or the left lower portion of the abdomen. Pain on the right side could be caused by appendicitis.      •You have bloody or black stools, or stools that look like tar.      •You have severe pain, cramping, or bloating in your abdomen.    •You have signs of dehydration, such as:  •Dark urine, very little urine, or no urine.      •Cracked lips.      •Dry mouth.      •Sunken eyes.      •Sleepiness.      •Weakness.        •You have trouble breathing or chest pain.        Summary    •Often, abdominal pain is not serious and it gets better with no treatment or by being treated at home. However, sometimes abdominal pain is serious.      •Watch your condition for any changes.      •Take over-the-counter and prescription medicines only as told by your health care provider.      •Contact a health care provider if your abdominal pain changes or gets worse.      •Get help right away if you have severe pain, cramping, or bloating in your abdomen.      This information is not intended to replace advice given to you by your health care provider. Make sure you discuss any questions you have with your health care provider. ACUTE NAUSEA AND VOMITING - AfterCare(R) Instructions(ER/ED)       Acute Nausea and Vomiting  WHAT YOU NEED TO KNOW:  Acute means the nausea and vomiting starts suddenly, gets worse quickly, and lasts a short time. There are many possible causes of acute nausea and vomiting.  DISCHARGE INSTRUCTIONS:  Call your local emergency number (911 in the [place]) if:   •You have chest pain.  •You have severe pain or cramping in your abdomen.  •Your vision is blurred.  •You are confused, have a high fever, or a stiff neck.  •You have bright red blood coming from your rectum.  •Your vomit smells like bowel movement.  Return to the emergency department if:   •You have a severe headache or pain.  •You are dizzy, cold, and thirsty, and your eyes and mouth are dry.  •You are urinating very little or not at all.  •You are dizzy or lightheaded when you stand up.  •You see blood or material that looks like coffee grounds in your vomit.  Call your doctor if:   •You continue to vomit for more than 48 hours.  •Your nausea and vomiting does not get better or go away after you use medicine.  •You have questions or concerns about your condition or care.  Medicines: You may need any of the following:   •Medicines may be given to calm your stomach and stop your vomiting. You may also need medicines to help empty your stomach and bowels.  •Take your medicine as directed. Contact your healthcare provider if you think your medicine is not helping or if you have side effects. Tell your provider if you are allergic to any medicine. Keep a list of the medicines, vitamins, and herbs you take. Include the amounts, and when and why you take them. Bring the list or the pill bottles to follow-up visits. Carry your medicine list with you in case of an emergency.  Manage your symptoms:   •Rest as much as you can. Too much activity can make your nausea worse.  •Drink more liquids to prevent dehydration. Take small sips. Try drinks such as ginger ale, lemonade, water, or tea. Your provider may recommend that you drink an oral rehydration solution (ORS). ORS contains water, salts, and sugar that are needed to replace the lost body fluids.  •Eat smaller meals, more often. Try bland foods and avoid spices or strong flavors  •Do not drink alcohol. Alcohol may upset or irritate your stomach.  Follow up with your doctor as directed: Write down your questions so you remember to ask them during your visits                Abdominal Pain, Adult  Pain in the abdomen (abdominal pain) can be caused by many things. Often, abdominal pain is not serious and it gets better with no treatment or by being treated at home. However, sometimes abdominal pain is serious.  Your health care provider will ask questions about your medical history and do a physical exam to try to determine the cause of your abdominal pain.  Follow these instructions at home:  Medicines   •Take over-the-counter and prescription medicines only as told by your health care provider.  • Do not take a laxative unless told by your health care provider.  General instructions   •Watch your condition for any changes.  •Drink enough fluid to keep your urine pale yellow.  •Keep all follow-up visits as told by your health care provider. This is important.  Contact a health care provider if:  •Your abdominal pain changes or gets worse.  •You are not hungry or you lose weight without trying.  •You are constipated or have diarrhea for more than 2–3 days.  •You have pain when you urinate or have a bowel movement.  •Your abdominal pain wakes you up at night.  •Your pain gets worse with meals, after eating, or with certain foods.  •You are vomiting and cannot keep anything down.  •You have a fever.  •You have blood in your urine.  Get help right away if:  •Your pain does not go away as soon as your health care provider told you to expect.  •You cannot stop vomiting.  •Your pain is only in areas of the abdomen, such as the right side or the left lower portion of the abdomen. Pain on the right side could be caused by appendicitis.  •You have bloody or black stools, or stools that look like tar.  •You have severe pain, cramping, or bloating in your abdomen.  •You have signs of dehydration, such as:  •Dark urine, very little urine, or no urine.  •Cracked lips  •Dry mouth.  •Sunken eyes.  •Sleepiness  •Weakness.  •You have trouble breathing or chest pain.  Summary  •Often, abdominal pain is not serious and it gets better with no treatment or by being treated at home. However, sometimes abdominal pain is serious.  •Watch your condition for any changes.  •Take over-the-counter and prescription medicines only as told by your health care provider.  •Contact a health care provider if your abdominal pain changes or gets worse.  •Get help right away if you have severe pain, cramping, or bloating in your abdomen.  This information is not intended to replace advice given to you by your health care provider. Make sure you discuss any questions you have with your health care provider.

## 2022-12-02 NOTE — ED PROVIDER NOTE - NSFOLLOWUPINSTRUCTIONS_ED_ALL_ED_FT
[facility]  [address] [address]  [phone]    OB/GYN   [address] [place] [address]  [phone]      Miscarriage    A miscarriage is the loss of a pregnancy before the 20th week of pregnancy. Sometimes, a pregnancy ends before a woman knows that she is pregnant.    If you lose a pregnancy, talk with your doctor about:  •Questions you have about the loss of your baby.    •How to work through your grief.    •Plans for future pregnancy.    What are the causes?    Many times, the cause of this condition is not known.    What increases the risk?    These things may make a pregnant woman more likely to lose a pregnancy:    Certain health conditions   •Conditions that affect hormones, such as:   •Thyroid disease.    •Polycystic ovary syndrome.    •Diabetes.    •A disease that causes the body's disease-fighting system to attack itself by mistake.    •Infections.    •Bleeding problems.    •Being very overweight.    Lifestyle factors     •Using products that have tobacco or nicotine in them.    •Being around tobacco smoke.    •Having alcohol.    •Having a lot of caffeine.    •Using drugs.    Problems with reproductive organs or parts     •Having a cervix that opens and thins before your due date. The cervix is the lowest part of your womb.    •Having Asherman syndrome, which leads to:  •Scars in the womb.    •The womb being abnormal in shape.    •Growths (fibroids) in the womb.    •Problems in the body that are present at birth.    •Infection of the cervix or womb.    Personal or health history     •Injury.    •Having lost a pregnancy before.    •Being younger than age 18 or older than age 35.    •Being around a harmful substance, such as radiation.    •Having lead or other heavy metals in:  •Things you eat or drink.    •The air around you.    •Using certain medicines.    What are the signs or symptoms?    •Blood or spots of blood coming from the vagina. You may also have cramps or pain.    •Pain or cramps in the belly or low back.    •Fluid or tissue coming out of the vagina.    How is this treated?    Sometimes, treatment is not needed.    If you need treatment, you may be treated with:  •A procedure to open the cervix more and take tissue out of the womb.    •Medicines. You may get a shot of medicine called Rho(D) immune globulin.    Follow these instructions at home:    Medicines     •Take over-the-counter and prescription medicines only as told by your doctor.    •If you were prescribed antibiotic medicine, take it as told by your doctor. Do not stop taking it even if you start to feel better.    Activity     •Rest as told by your doctor. Ask your doctor what activities are safe for you.    •Have someone help you at home during this time.    General instructions      •Watch how much tissue comes out of the vagina.    •Watch the size of any blood clots that come out of the vagina.    • Do not have sex or douche until your doctor says it is okay.    • Do not put things, such as tampons, in your vagina until your doctor says it is okay.    •To help you and your partner with grieving:  •Talk with your doctor.    •See a counselor.    •When you are ready, talk with your doctor about:   •Things to do for your health.    •How you can be healthy if you get pregnant again.    •Keep all follow-up visits    Where to find more information    •The American College of Obstetricians and Gynecologists: acog.org    •[place] Department of Health and Human Services Office of Women's Health: hrsa.gov/office-womens-health    Contact a doctor if:    •You have a fever or chills.    •There is bad-smelling fluid coming from the vagina.    •You have more bleeding.    •Tissue or clots of blood come out of your vagina.    Get help right away if:    •You have very bad cramps or pain in your back or belly.    •You soak more than 2 large pads in an hour for more than 2 hours.    •You get light-headed or weak.    •You faint.    •You feel sad, and you have sad thoughts a lot of the time.    •You think about hurting yourself.    Get help right away if you feel like you may hurt yourself or others, or have thoughts about taking your own life. Go to your nearest emergency room or:    • Call your local emergency services (574 in the [place]     • Call the National Suicide Prevention Lifeline at 1-120.560.7667 or 715 in the [place] This is open 24 hours a day.     • Text the Crisis Text Line at 355878.     Summary    •A miscarriage is the loss of a pregnancy before the 20th week of pregnancy. Sometimes, a pregnancy ends before a woman knows that she is pregnant.    •Follow instructions from your doctor about medicines and activity.    •To help you and your partner with grieving, talk with your doctor or a counselor.    •Keep all follow-up visits.    This information is not intended to replace advice given to you by your health care provider. Make sure you discuss any questions you have with your health care provider.

## 2022-12-03 VITALS
TEMPERATURE: 98 F | DIASTOLIC BLOOD PRESSURE: 78 MMHG | SYSTOLIC BLOOD PRESSURE: 139 MMHG | HEART RATE: 71 BPM | RESPIRATION RATE: 17 BRPM | OXYGEN SATURATION: 99 %

## 2022-12-03 LAB
ANION GAP SERPL CALC-SCNC: 18 MMOL/L — HIGH (ref 5–17)
APPEARANCE UR: CLEAR — SIGNIFICANT CHANGE UP
BACTERIA # UR AUTO: PRESENT /HPF
BASE EXCESS BLDV CALC-SCNC: 0.8 MMOL/L — SIGNIFICANT CHANGE UP (ref -2–3)
BILIRUB UR-MCNC: NEGATIVE — SIGNIFICANT CHANGE UP
BUN SERPL-MCNC: 10 MG/DL — SIGNIFICANT CHANGE UP (ref 7–23)
CA-I SERPL-SCNC: 1.11 MMOL/L — LOW (ref 1.15–1.33)
CALCIUM SERPL-MCNC: 8.8 MG/DL — SIGNIFICANT CHANGE UP (ref 8.4–10.5)
CHLORIDE SERPL-SCNC: 95 MMOL/L — LOW (ref 96–108)
CO2 BLDV-SCNC: 28.6 MMOL/L — HIGH (ref 22–26)
CO2 SERPL-SCNC: 19 MMOL/L — LOW (ref 22–31)
COLOR SPEC: YELLOW — SIGNIFICANT CHANGE UP
CREAT SERPL-MCNC: 0.67 MG/DL — SIGNIFICANT CHANGE UP (ref 0.5–1.3)
DIFF PNL FLD: ABNORMAL
EGFR: 108 ML/MIN/1.73M2 — SIGNIFICANT CHANGE UP
EPI CELLS # UR: SIGNIFICANT CHANGE UP /HPF (ref 0–5)
GAS PNL BLDV: 134 MMOL/L — LOW (ref 136–145)
GAS PNL BLDV: SIGNIFICANT CHANGE UP
GAS PNL BLDV: SIGNIFICANT CHANGE UP
GLUCOSE SERPL-MCNC: 137 MG/DL — HIGH (ref 70–99)
GLUCOSE UR QL: NEGATIVE — SIGNIFICANT CHANGE UP
HCO3 BLDV-SCNC: 27 MMOL/L — SIGNIFICANT CHANGE UP (ref 22–29)
KETONES UR-MCNC: NEGATIVE — SIGNIFICANT CHANGE UP
LEUKOCYTE ESTERASE UR-ACNC: NEGATIVE — SIGNIFICANT CHANGE UP
NITRITE UR-MCNC: NEGATIVE — SIGNIFICANT CHANGE UP
PCO2 BLDV: 49 MMHG — HIGH (ref 39–42)
PH BLDV: 7.35 — SIGNIFICANT CHANGE UP (ref 7.32–7.43)
PH UR: 7 — SIGNIFICANT CHANGE UP (ref 5–8)
PO2 BLDV: 42 MMHG — SIGNIFICANT CHANGE UP (ref 25–45)
POTASSIUM BLDV-SCNC: 3.4 MMOL/L — LOW (ref 3.5–5.1)
POTASSIUM SERPL-MCNC: SIGNIFICANT CHANGE UP MMOL/L (ref 3.5–5.3)
POTASSIUM SERPL-SCNC: SIGNIFICANT CHANGE UP MMOL/L (ref 3.5–5.3)
PROT UR-MCNC: ABNORMAL MG/DL
RBC CASTS # UR COMP ASSIST: < 5 /HPF — SIGNIFICANT CHANGE UP
SAO2 % BLDV: 71.6 % — SIGNIFICANT CHANGE UP (ref 67–88)
SODIUM SERPL-SCNC: 132 MMOL/L — LOW (ref 135–145)
SP GR SPEC: 1.02 — SIGNIFICANT CHANGE UP (ref 1–1.03)
TROPONIN T SERPL-MCNC: <0.01 NG/ML — SIGNIFICANT CHANGE UP (ref 0–0.01)
UROBILINOGEN FLD QL: 0.2 E.U./DL — SIGNIFICANT CHANGE UP
WBC UR QL: > 10 /HPF

## 2022-12-03 PROCEDURE — 84702 CHORIONIC GONADOTROPIN TEST: CPT

## 2022-12-03 PROCEDURE — 99285 EMERGENCY DEPT VISIT HI MDM: CPT | Mod: 25

## 2022-12-03 PROCEDURE — 96374 THER/PROPH/DIAG INJ IV PUSH: CPT

## 2022-12-03 PROCEDURE — 81001 URINALYSIS AUTO W/SCOPE: CPT

## 2022-12-03 PROCEDURE — 84100 ASSAY OF PHOSPHORUS: CPT

## 2022-12-03 PROCEDURE — 80053 COMPREHEN METABOLIC PANEL: CPT

## 2022-12-03 PROCEDURE — 36415 COLL VENOUS BLD VENIPUNCTURE: CPT

## 2022-12-03 PROCEDURE — 86901 BLOOD TYPING SEROLOGIC RH(D): CPT

## 2022-12-03 PROCEDURE — 83735 ASSAY OF MAGNESIUM: CPT

## 2022-12-03 PROCEDURE — 82330 ASSAY OF CALCIUM: CPT

## 2022-12-03 PROCEDURE — 87635 SARS-COV-2 COVID-19 AMP PRB: CPT

## 2022-12-03 PROCEDURE — 86850 RBC ANTIBODY SCREEN: CPT

## 2022-12-03 PROCEDURE — 82803 BLOOD GASES ANY COMBINATION: CPT

## 2022-12-03 PROCEDURE — 84484 ASSAY OF TROPONIN QUANT: CPT

## 2022-12-03 PROCEDURE — 71045 X-RAY EXAM CHEST 1 VIEW: CPT

## 2022-12-03 PROCEDURE — 84295 ASSAY OF SERUM SODIUM: CPT

## 2022-12-03 PROCEDURE — 93005 ELECTROCARDIOGRAM TRACING: CPT

## 2022-12-03 PROCEDURE — 86900 BLOOD TYPING SEROLOGIC ABO: CPT

## 2022-12-03 PROCEDURE — 96372 THER/PROPH/DIAG INJ SC/IM: CPT | Mod: XU

## 2022-12-03 PROCEDURE — 85025 COMPLETE CBC W/AUTO DIFF WBC: CPT

## 2022-12-03 PROCEDURE — 96375 TX/PRO/DX INJ NEW DRUG ADDON: CPT

## 2022-12-03 PROCEDURE — 84132 ASSAY OF SERUM POTASSIUM: CPT

## 2022-12-03 PROCEDURE — 80048 BASIC METABOLIC PNL TOTAL CA: CPT

## 2022-12-03 RX ORDER — ONDANSETRON 8 MG/1
4 TABLET, FILM COATED ORAL ONCE
Refills: 0 | Status: COMPLETED | OUTPATIENT
Start: 2022-12-03 | End: 2022-12-03

## 2022-12-03 RX ORDER — SODIUM CHLORIDE 9 MG/ML
1000 INJECTION INTRAMUSCULAR; INTRAVENOUS; SUBCUTANEOUS ONCE
Refills: 0 | Status: COMPLETED | OUTPATIENT
Start: 2022-12-03 | End: 2022-12-03

## 2022-12-03 RX ORDER — CEFPODOXIME PROXETIL 100 MG
1 TABLET ORAL
Qty: 14 | Refills: 0
Start: 2022-12-03 | End: 2022-12-09

## 2022-12-03 RX ORDER — CEFTRIAXONE 500 MG/1
1000 INJECTION, POWDER, FOR SOLUTION INTRAMUSCULAR; INTRAVENOUS ONCE
Refills: 0 | Status: COMPLETED | OUTPATIENT
Start: 2022-12-03 | End: 2022-12-03

## 2022-12-03 RX ORDER — POTASSIUM CHLORIDE 20 MEQ
40 PACKET (EA) ORAL ONCE
Refills: 0 | Status: COMPLETED | OUTPATIENT
Start: 2022-12-03 | End: 2022-12-03

## 2022-12-03 RX ORDER — ONDANSETRON 8 MG/1
1 TABLET, FILM COATED ORAL
Qty: 15 | Refills: 0
Start: 2022-12-03

## 2022-12-03 RX ADMIN — CEFTRIAXONE 100 MILLIGRAM(S): 500 INJECTION, POWDER, FOR SOLUTION INTRAMUSCULAR; INTRAVENOUS at 07:22

## 2022-12-03 RX ADMIN — ONDANSETRON 4 MILLIGRAM(S): 8 TABLET, FILM COATED ORAL at 07:12

## 2022-12-03 RX ADMIN — Medication 200 MILLIGRAM(S): at 05:56

## 2022-12-03 RX ADMIN — SODIUM CHLORIDE 1000 MILLILITER(S): 9 INJECTION INTRAMUSCULAR; INTRAVENOUS; SUBCUTANEOUS at 05:35

## 2022-12-03 NOTE — ED ADULT NURSE REASSESSMENT NOTE - NS ED NURSE REASSESS COMMENT FT1
patient has been sleeping the entire night in the ed no episodes of vomiting, just ambulated to the bathroom with steady gait. Each time the doctor re-assesses patient she states she is fine and then when it comes to sending her home patient refuses demanding to be admitted. Provider speaking to patient at this time.

## 2022-12-03 NOTE — ED ADULT NURSE REASSESSMENT NOTE - NS ED NURSE REASSESS COMMENT FT1
Pt noted to be taking pictures in patient treatment area and of staff.  Case management RN retrieving clothes for patient however pt declines clothes offered at this time. Rn removed iv heplock. Security at bedside. Pt left in gown with jacket with discharge papers.

## 2022-12-03 NOTE — ED ADULT NURSE REASSESSMENT NOTE - NS ED NURSE REASSESS COMMENT FT1
Pt received Zofran and attempted to drink gingerale. Pt vomited shortly after. Mireya CONELY aware. Pt attempted to drink gingerale. Pt vomited shortly after. Mireya CONLEY aware.

## 2022-12-03 NOTE — ED ADULT NURSE REASSESSMENT NOTE - NS ED NURSE REASSESS COMMENT FT1
Pt informed that they are discharged. pt states does not feel well and does not want to go home.  Pt informed by Dr. Ferguson that they are discharged.  pt refused to let this RN remove IV.  Charge nurse notified.  security called to bedside.  pt states does not have clean clothes.  pt offered clothes by case management, which they refused.

## 2022-12-03 NOTE — ED ADULT NURSE REASSESSMENT NOTE - GENERAL PATIENT STATE
comfortable appearance/smiling/interactive
patient openly stating "I don't feel well and I have nausea." Received patient holding basin to mouth and crying./anxious

## 2022-12-03 NOTE — ED ADULT NURSE REASSESSMENT NOTE - NS ED NURSE REASSESS COMMENT FT1
Pt was endorsed to REEMA Landin. For vitals pls see flowsheet, labs sent. Pt was endorsed to REEMA Landin. For vitals pls see flowsheet, labs sent. ECG is in pt's chart.

## 2022-12-03 NOTE — ED ADULT NURSE REASSESSMENT NOTE - NS ED NURSE REASSESS COMMENT FT1
received patient at 109am, verbal and chart review done with primary RN vera for safe patient hand off. Three labs blood gas troponin, and BMP ordered three hours prior to writer being assigned patient pending collection. Per Jimena patient hard stick, per provider labs are not redraws second trop wanted by provider along with other ordered labs. Labs drawn by Jose RN. Patient remains with nausea and noted to be holding basin to face, NP notified.

## 2022-12-24 NOTE — ED ADULT NURSE NOTE - CAS DISCH TRANSFER METHOD
no wheezes/no rales/no rhonchi/airway patent/breath sounds equal
no wheezes/no rhonchi/breath sounds equal/rales
Transportation service/Self

## 2024-01-06 NOTE — ED ADULT NURSE REASSESSMENT NOTE - TEMPLATE LIST FOR HEAD TO TOE ASSESSMENT
Abdominal Pain, N/V/D verbalizes understanding/demonstrates understanding of teaching/good return demonstration

## 2025-01-15 NOTE — ED ADULT NURSE NOTE - NS ED NURSE LEVEL OF CONSCIOUSNESS SPEECH
Speaking Coherently hx obtained from Mother at bedside.  PE and A/P discussed.  --MD Maria Del Carmen

## 2025-03-03 NOTE — ED PROVIDER NOTE - PMH
Chronic gastric ulcer  Gastric ulcer without hemorrhage or perforation, chronic  Endometriosis     None known

## 2025-04-07 NOTE — ED ADULT NURSE NOTE - OBJECTIVE STATEMENT
Stable. Records review BMD (7/28/23) reports T - 0.7/ Z 0.9 ( L spine); T -1.8/ Z 0.7 ( L femur) consider daily isometric exercise. Cont f/u with PCP for ongoing monitoring.    Per pt, returns to ED after leaving hours ago with h/o nausea, vomiting and abdominal pain.